# Patient Record
Sex: FEMALE | Race: WHITE | NOT HISPANIC OR LATINO | URBAN - METROPOLITAN AREA
[De-identification: names, ages, dates, MRNs, and addresses within clinical notes are randomized per-mention and may not be internally consistent; named-entity substitution may affect disease eponyms.]

---

## 2017-05-30 ENCOUNTER — OUTPATIENT (OUTPATIENT)
Dept: OUTPATIENT SERVICES | Facility: HOSPITAL | Age: 32
LOS: 1 days | End: 2017-05-30
Payer: COMMERCIAL

## 2017-05-30 DIAGNOSIS — Z90.89 ACQUIRED ABSENCE OF OTHER ORGANS: Chronic | ICD-10-CM

## 2017-05-30 PROCEDURE — 76830 TRANSVAGINAL US NON-OB: CPT | Mod: 26

## 2017-05-30 PROCEDURE — 76830 TRANSVAGINAL US NON-OB: CPT

## 2017-07-07 ENCOUNTER — EMERGENCY (EMERGENCY)
Facility: HOSPITAL | Age: 32
LOS: 1 days | Discharge: HOME | End: 2017-07-07
Admitting: EMERGENCY MEDICINE
Payer: COMMERCIAL

## 2017-07-07 VITALS
OXYGEN SATURATION: 100 % | TEMPERATURE: 98 F | RESPIRATION RATE: 18 BRPM | DIASTOLIC BLOOD PRESSURE: 84 MMHG | HEART RATE: 59 BPM | SYSTOLIC BLOOD PRESSURE: 125 MMHG

## 2017-07-07 DIAGNOSIS — S80.01XA CONTUSION OF RIGHT KNEE, INITIAL ENCOUNTER: ICD-10-CM

## 2017-07-07 DIAGNOSIS — Z90.89 ACQUIRED ABSENCE OF OTHER ORGANS: Chronic | ICD-10-CM

## 2017-07-07 DIAGNOSIS — M25.561 PAIN IN RIGHT KNEE: ICD-10-CM
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
## 2017-07-07 DIAGNOSIS — Z79.899 OTHER LONG TERM (CURRENT) DRUG THERAPY: ICD-10-CM
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
## 2017-07-07 DIAGNOSIS — Z88.8 ALLERGY STATUS TO OTHER DRUGS, MEDICAMENTS AND BIOLOGICAL SUBSTANCES STATUS: ICD-10-CM

## 2017-07-07 PROCEDURE — 99283 EMERGENCY DEPT VISIT LOW MDM: CPT | Mod: 25

## 2017-07-07 PROCEDURE — 73562 X-RAY EXAM OF KNEE 3: CPT

## 2017-07-07 PROCEDURE — 73562 X-RAY EXAM OF KNEE 3: CPT | Mod: 26

## 2017-07-07 PROCEDURE — 73562 X-RAY EXAM OF KNEE 3: CPT | Mod: 26,RT

## 2017-07-07 RX ORDER — IBUPROFEN 200 MG
600 TABLET ORAL ONCE
Qty: 0 | Refills: 0 | Status: COMPLETED | OUTPATIENT
Start: 2017-07-07 | End: 2017-07-07

## 2017-07-07 RX ADMIN — Medication 600 MILLIGRAM(S): at 14:07

## 2017-07-07 NOTE — ED PROVIDER NOTE - OBJECTIVE STATEMENT
The pt is a 32 y/o F, who presents to ED c/o R knee pain s/p hitting it on a drawer at work [employer]. Pain is 6/10, pain aggravated w/walking, has not taken any pain meds. Denies numbness or tingling to toes, decreased ROM, bleeding, any other injuries

## 2017-07-07 NOTE — ED PROVIDER NOTE - MUSCULOSKELETAL, MLM
R knee: no swelling, no discolorations, quads intact, no patella tend, FROM, no increased ligamentous laxity, muscle strength 5/5, good resistance, pedal pulse 2+, normal gait

## 2017-07-07 NOTE — ED ADULT NURSE NOTE - CHPI ED SYMPTOMS NEG
no bruising/no stiffness/no back pain/no tingling/no deformity/no fever/no abrasion/no numbness/no difficulty bearing weight/no weakness

## 2017-07-07 NOTE — ED PROVIDER NOTE - MEDICAL DECISION MAKING DETAILS
pt hit knee on a drawer at work - xray neg for occult injury, ace wrap for ambulatory comfort, to RICE and prn ibuprofen, f/u w/ortho, pt understands and agrees w/plan

## 2018-02-25 VITALS
HEART RATE: 77 BPM | OXYGEN SATURATION: 97 % | DIASTOLIC BLOOD PRESSURE: 70 MMHG | TEMPERATURE: 98 F | RESPIRATION RATE: 16 BRPM | SYSTOLIC BLOOD PRESSURE: 138 MMHG

## 2018-02-25 LAB
ALBUMIN SERPL ELPH-MCNC: 5.1 G/DL — HIGH (ref 3.3–5)
ALP SERPL-CCNC: 70 U/L — SIGNIFICANT CHANGE UP (ref 40–120)
ALT FLD-CCNC: 9 U/L — LOW (ref 10–45)
ANION GAP SERPL CALC-SCNC: 16 MMOL/L — SIGNIFICANT CHANGE UP (ref 5–17)
APPEARANCE UR: CLEAR — SIGNIFICANT CHANGE UP
AST SERPL-CCNC: 14 U/L — SIGNIFICANT CHANGE UP (ref 10–40)
BASOPHILS NFR BLD AUTO: 1 % — SIGNIFICANT CHANGE UP (ref 0–2)
BILIRUB SERPL-MCNC: 0.2 MG/DL — SIGNIFICANT CHANGE UP (ref 0.2–1.2)
BILIRUB UR-MCNC: NEGATIVE — SIGNIFICANT CHANGE UP
BUN SERPL-MCNC: 13 MG/DL — SIGNIFICANT CHANGE UP (ref 7–23)
CALCIUM SERPL-MCNC: 9.6 MG/DL — SIGNIFICANT CHANGE UP (ref 8.4–10.5)
CHLORIDE SERPL-SCNC: 101 MMOL/L — SIGNIFICANT CHANGE UP (ref 96–108)
CO2 SERPL-SCNC: 25 MMOL/L — SIGNIFICANT CHANGE UP (ref 22–31)
COLOR SPEC: YELLOW — SIGNIFICANT CHANGE UP
CREAT SERPL-MCNC: 0.58 MG/DL — SIGNIFICANT CHANGE UP (ref 0.5–1.3)
DIFF PNL FLD: (no result)
EOSINOPHIL NFR BLD AUTO: 0.4 % — SIGNIFICANT CHANGE UP (ref 0–6)
GLUCOSE SERPL-MCNC: 113 MG/DL — HIGH (ref 70–99)
GLUCOSE UR QL: NEGATIVE — SIGNIFICANT CHANGE UP
HCG UR QL: POSITIVE — SIGNIFICANT CHANGE UP
HCT VFR BLD CALC: 42.4 % — SIGNIFICANT CHANGE UP (ref 34.5–45)
HGB BLD-MCNC: 13.7 G/DL — SIGNIFICANT CHANGE UP (ref 11.5–15.5)
KETONES UR-MCNC: NEGATIVE — SIGNIFICANT CHANGE UP
LEUKOCYTE ESTERASE UR-ACNC: NEGATIVE — SIGNIFICANT CHANGE UP
LIDOCAIN IGE QN: 36 U/L — SIGNIFICANT CHANGE UP (ref 7–60)
LYMPHOCYTES # BLD AUTO: 34.1 % — SIGNIFICANT CHANGE UP (ref 13–44)
MCHC RBC-ENTMCNC: 28.8 PG — SIGNIFICANT CHANGE UP (ref 27–34)
MCHC RBC-ENTMCNC: 32.3 G/DL — SIGNIFICANT CHANGE UP (ref 32–36)
MCV RBC AUTO: 89.3 FL — SIGNIFICANT CHANGE UP (ref 80–100)
MONOCYTES NFR BLD AUTO: 7 % — SIGNIFICANT CHANGE UP (ref 2–14)
NEUTROPHILS NFR BLD AUTO: 57.5 % — SIGNIFICANT CHANGE UP (ref 43–77)
NITRITE UR-MCNC: NEGATIVE — SIGNIFICANT CHANGE UP
PH UR: 5.5 — SIGNIFICANT CHANGE UP (ref 5–8)
PLATELET # BLD AUTO: 265 K/UL — SIGNIFICANT CHANGE UP (ref 150–400)
POTASSIUM SERPL-MCNC: 3.9 MMOL/L — SIGNIFICANT CHANGE UP (ref 3.5–5.3)
POTASSIUM SERPL-SCNC: 3.9 MMOL/L — SIGNIFICANT CHANGE UP (ref 3.5–5.3)
PROT SERPL-MCNC: 8.1 G/DL — SIGNIFICANT CHANGE UP (ref 6–8.3)
PROT UR-MCNC: NEGATIVE MG/DL — SIGNIFICANT CHANGE UP
RBC # BLD: 4.75 M/UL — SIGNIFICANT CHANGE UP (ref 3.8–5.2)
RBC # FLD: 13 % — SIGNIFICANT CHANGE UP (ref 10.3–16.9)
SODIUM SERPL-SCNC: 142 MMOL/L — SIGNIFICANT CHANGE UP (ref 135–145)
SP GR SPEC: >=1.03 — SIGNIFICANT CHANGE UP (ref 1–1.03)
UROBILINOGEN FLD QL: 0.2 E.U./DL — SIGNIFICANT CHANGE UP
WBC # BLD: 10.5 K/UL — SIGNIFICANT CHANGE UP (ref 3.8–10.5)
WBC # FLD AUTO: 10.5 K/UL — SIGNIFICANT CHANGE UP (ref 3.8–10.5)

## 2018-02-25 PROCEDURE — 76856 US EXAM PELVIC COMPLETE: CPT | Mod: 26

## 2018-02-25 PROCEDURE — 99285 EMERGENCY DEPT VISIT HI MDM: CPT

## 2018-02-25 PROCEDURE — 76830 TRANSVAGINAL US NON-OB: CPT | Mod: 26

## 2018-02-25 PROCEDURE — 76705 ECHO EXAM OF ABDOMEN: CPT | Mod: 26

## 2018-02-25 RX ORDER — MORPHINE SULFATE 50 MG/1
2 CAPSULE, EXTENDED RELEASE ORAL ONCE
Qty: 0 | Refills: 0 | Status: DISCONTINUED | OUTPATIENT
Start: 2018-02-25 | End: 2018-02-25

## 2018-02-25 NOTE — ED ADULT NURSE NOTE - OBJECTIVE STATEMENT
Pt to ER w/ report of R-sided lower abd pain.  Pt denies cp/sob/f/c.  Breathing unlabored, skin warm and dry. IV access established, labs drawn and sent. Will continue to monitor. Pt to ER w/ report of R-sided lower abd pain today.  Pt describes pain as radiating to R. leg.  Pt denies cp/sob/f/c.  Breathing unlabored, skin warm and dry. IV access established, labs drawn and sent. Will continue to monitor.

## 2018-02-26 ENCOUNTER — INPATIENT (INPATIENT)
Facility: HOSPITAL | Age: 33
LOS: 0 days | Discharge: HOME | DRG: 777 | End: 2018-02-26
Attending: OBSTETRICS & GYNECOLOGY | Admitting: OBSTETRICS & GYNECOLOGY
Payer: COMMERCIAL

## 2018-02-26 ENCOUNTER — TRANSCRIPTION ENCOUNTER (OUTPATIENT)
Age: 33
End: 2018-02-26

## 2018-02-26 VITALS
OXYGEN SATURATION: 98 % | SYSTOLIC BLOOD PRESSURE: 118 MMHG | HEART RATE: 87 BPM | RESPIRATION RATE: 16 BRPM | DIASTOLIC BLOOD PRESSURE: 64 MMHG | TEMPERATURE: 97 F

## 2018-02-26 DIAGNOSIS — Z90.89 ACQUIRED ABSENCE OF OTHER ORGANS: Chronic | ICD-10-CM

## 2018-02-26 LAB
HCT VFR BLD CALC: 37.1 % — SIGNIFICANT CHANGE UP (ref 34.5–45)
HCT VFR BLD CALC: 37.4 % — SIGNIFICANT CHANGE UP (ref 34.5–45)
HGB BLD-MCNC: 11.9 G/DL — SIGNIFICANT CHANGE UP (ref 11.5–15.5)
HGB BLD-MCNC: 12.4 G/DL — SIGNIFICANT CHANGE UP (ref 11.5–15.5)
MCHC RBC-ENTMCNC: 28.9 PG — SIGNIFICANT CHANGE UP (ref 27–34)
MCHC RBC-ENTMCNC: 29.8 PG — SIGNIFICANT CHANGE UP (ref 27–34)
MCHC RBC-ENTMCNC: 32.1 G/DL — SIGNIFICANT CHANGE UP (ref 32–36)
MCHC RBC-ENTMCNC: 33.2 G/DL — SIGNIFICANT CHANGE UP (ref 32–36)
MCV RBC AUTO: 89.9 FL — SIGNIFICANT CHANGE UP (ref 80–100)
MCV RBC AUTO: 90 FL — SIGNIFICANT CHANGE UP (ref 80–100)
PLATELET # BLD AUTO: 195 K/UL — SIGNIFICANT CHANGE UP (ref 150–400)
PLATELET # BLD AUTO: 230 K/UL — SIGNIFICANT CHANGE UP (ref 150–400)
RBC # BLD: 4.12 M/UL — SIGNIFICANT CHANGE UP (ref 3.8–5.2)
RBC # BLD: 4.16 M/UL — SIGNIFICANT CHANGE UP (ref 3.8–5.2)
RBC # FLD: 13.2 % — SIGNIFICANT CHANGE UP (ref 10.3–16.9)
RBC # FLD: 13.4 % — SIGNIFICANT CHANGE UP (ref 10.3–16.9)
WBC # BLD: 11.2 K/UL — HIGH (ref 3.8–10.5)
WBC # BLD: 7.9 K/UL — SIGNIFICANT CHANGE UP (ref 3.8–10.5)
WBC # FLD AUTO: 11.2 K/UL — HIGH (ref 3.8–10.5)
WBC # FLD AUTO: 7.9 K/UL — SIGNIFICANT CHANGE UP (ref 3.8–10.5)

## 2018-02-26 PROCEDURE — 86901 BLOOD TYPING SEROLOGIC RH(D): CPT

## 2018-02-26 PROCEDURE — 86850 RBC ANTIBODY SCREEN: CPT

## 2018-02-26 PROCEDURE — 83690 ASSAY OF LIPASE: CPT

## 2018-02-26 PROCEDURE — 85027 COMPLETE CBC AUTOMATED: CPT

## 2018-02-26 PROCEDURE — 96374 THER/PROPH/DIAG INJ IV PUSH: CPT

## 2018-02-26 PROCEDURE — 81001 URINALYSIS AUTO W/SCOPE: CPT

## 2018-02-26 PROCEDURE — 76856 US EXAM PELVIC COMPLETE: CPT

## 2018-02-26 PROCEDURE — 80053 COMPREHEN METABOLIC PANEL: CPT

## 2018-02-26 PROCEDURE — 81025 URINE PREGNANCY TEST: CPT

## 2018-02-26 PROCEDURE — 76705 ECHO EXAM OF ABDOMEN: CPT

## 2018-02-26 PROCEDURE — 85025 COMPLETE CBC W/AUTO DIFF WBC: CPT

## 2018-02-26 PROCEDURE — 86900 BLOOD TYPING SEROLOGIC ABO: CPT

## 2018-02-26 PROCEDURE — 99285 EMERGENCY DEPT VISIT HI MDM: CPT | Mod: 25

## 2018-02-26 PROCEDURE — 76830 TRANSVAGINAL US NON-OB: CPT

## 2018-02-26 PROCEDURE — 84702 CHORIONIC GONADOTROPIN TEST: CPT

## 2018-02-26 RX ORDER — MORPHINE SULFATE 50 MG/1
2 CAPSULE, EXTENDED RELEASE ORAL ONCE
Qty: 0 | Refills: 0 | Status: DISCONTINUED | OUTPATIENT
Start: 2018-02-26 | End: 2018-02-26

## 2018-02-26 RX ORDER — SODIUM CHLORIDE 9 MG/ML
1000 INJECTION, SOLUTION INTRAVENOUS
Qty: 0 | Refills: 0 | Status: DISCONTINUED | OUTPATIENT
Start: 2018-02-26 | End: 2018-02-26

## 2018-02-26 RX ADMIN — MORPHINE SULFATE 2 MILLIGRAM(S): 50 CAPSULE, EXTENDED RELEASE ORAL at 01:10

## 2018-02-26 RX ADMIN — MORPHINE SULFATE 2 MILLIGRAM(S): 50 CAPSULE, EXTENDED RELEASE ORAL at 00:54

## 2018-02-26 NOTE — DISCHARGE NOTE ADULT - CARE PLAN
Principal Discharge DX:	Pregnancy of unknown anatomic location  Goal:	Recovery  Assessment and plan of treatment:	Pelvic rest (nothing in vagina) x4 weeks. Follow up with own GYN or GYN at [facility] 2/27/18 or come to ED for repeat bHCG check. It is of high importance that you follow up on 2/27/18 and get bHCG level checked. Go to nearest ED if fever >100.4, severe abdominal pain or heavy vaginal bleeding.

## 2018-02-26 NOTE — DISCHARGE NOTE ADULT - PATIENT PORTAL LINK FT
You can access the [facility] Patient Portal, offered by [facility], by registering with the following website: [email or web address]

## 2018-02-26 NOTE — DISCHARGE NOTE ADULT - CARE PROVIDER_API CALL
Ruddy Borja), Obstetrics and Gynecology  [address] [place] [address]  Phone: [phone]  Fax: [phone]

## 2018-02-26 NOTE — H&P ADULT - HISTORY OF PRESENT ILLNESS
31yo  at 1w4d by LMP 2/15 presents to ED with RLQ pain since 7:30am . Patient reports pain is worsened by movement and sitting up. She notes pain became 13/10 in severity at 5pm causing her to come to the ED. Pt has a Mirena IUD placed in April or 2017. Pt notes vaginal spotting that started today. Patient reports 2 episodes of diarrhea yesterday, but otherwise denies fever, chills, nausea, vomiting. Pt did not know she was pregnant prior to coming to the ED. Pt reports if she has an IUP, it is desired.    ObHx:   - x2. last delivery 13months ago, she got pregnant while having a Raissa IUD  GynHx: mirena IUD placed april or 2017 pt is unsure. reports h/o ovarian cysts but denies fibroids, polyps, abnormal pap smear, STIs  PMH: denies  PSH: upper jaw surgery , tonsillectomy and adenoidectomy at 4yo  Meds: denies  NKDA    Vital Signs Last 24 Hrs  T(C): 36.3 (2018 03:43), Max: 36.9 (2018 00:21)  T(F): 97.3 (2018 03:43), Max: 98.4 (2018 00:21)  HR: 82 (2018 03:43) (74 - 82)  BP: 137/97 (2018 03:43) (121/77 - 138/70)  BP(mean): --  RR: 16 (2018 03:43) (16 - 16)  SpO2: 98% (2018 03:43) (97% - 98%)  Gen: NAD  Card: RRR no m/r/g  Pulm: CTAB no crackles or wheezes  Abd: soft, mildly tender to palpation of right suprapubic region, no rebound or guarding  : normal appearing external genitaila. on speculum exam cervix appears long and closed, IUD strings and end of IUD visualized from cervical os. scant dark blood in vaginla vault and at cervical os, no active bleeding. on bimanual exam diffuse tenderness to posterior vaginal      Hemoglobin: 13.7 g/dL (18 @ 20:32               12.4   11.2  )-----------( 230      ( 2018 02:11 )             37.4     HCG Quantitative, Serum (18 @ 20:32)    HCG Quantitative, Serum: 150.4    Urinalysis Basic - ( 2018 20:32 )    Color: Yellow / Appearance: Clear / SG: >=1.030 / pH: x  Gluc: x / Ketone: NEGATIVE  / Bili: Negative / Urobili: 0.2 E.U./dL   Blood: x / Protein: NEGATIVE mg/dL / Nitrite: NEGATIVE   Leuk Esterase: NEGATIVE / RBC: Many /HPF / WBC < 5 /HPF   Sq Epi: x / Non Sq Epi: 0-5 /HPF / Bacteria: Present /HPF          INTERPRETATION:  PELVIC ULTRASOUND dated 2018 11:17 PM    INDICATION: 32 year old female with right lower quadrant pain, vaginal   bleeding. Patient has an IUD.  B-HC     TECHNIQUE: Transabdominal views of the pelvis were obtained followed by   transvaginal views for better visualization of the ovaries.      PRIOR STUDIES: Transvaginal ultrasound dated 2017     FINDINGS:   These images demonstrate the uterus to be anteverted. The uterus is   normal in size and shape.  The uterus is 7.1 x 3.7 x 3.7 cm.  No   myometrial abnormalities are seen. An IUD device is seen within the lower   uterine segment/upper cervix. The endometrium is 0.2 cm in thickness,   which is normal.    The right ovary is normal in size, measuring 3.5 x 2.1 x 1.7 cm. Within   the right ovary, there is a 1.7 cm corpus luteum and a 0.7 cm dominant   follicle. The left ovary is normal in size, measuring 3.2 x 2.1 x 1.7 cm.   No left ovarian masses are seen. Doppler evaluation demonstrates flow to   both ovaries with no evidence of torsion.      There is a small to moderate amount of complex free fluid within the   pelvis, the majority of which surrounds the right ovary. No definite   ectopic pregnancy is visualized within the pelvis.    A physiologic amount of free fluid is seen in the cul-de-sac.      IMPRESSION:   No intra or extrauterine gestation identified. There is small to moderate   amount of complex free fluid within the pelvis. An extra uterine   pregnancy cannot be excluded.  OB/GYN consultation is recommended. 33yo  at 1w4d by LMP 2/15 presents to ED with RLQ pain since 7:30am . Patient reports pain is worsened by movement and sitting up. She notes pain became 13/10 in severity at 5pm causing her to come to the ED. Pt has a Mirena IUD placed in April or 2017. Pt notes vaginal spotting that started today. Patient reports 2 episodes of diarrhea yesterday, but otherwise denies fever, chills, nausea, vomiting. Pt did not know she was pregnant prior to coming to the ED. Pt reports if she has an IUP, it is desired.    ObHx:   - x2. last delivery 13months ago, she got pregnant while having a Raissa IUD  GynHx: mirena IUD placed april or 2017 pt is unsure. reports h/o ovarian cysts but denies fibroids, polyps, abnormal pap smear, STIs  PMH: denies  PSH: upper jaw surgery , tonsillectomy and adenoidectomy at 6yo  Meds: denies  NKDA    Vital Signs Last 24 Hrs  T(C): 36.3 (2018 03:43), Max: 36.9 (2018 00:21)  T(F): 97.3 (2018 03:43), Max: 98.4 (2018 00:21)  HR: 82 (2018 03:43) (74 - 82)  BP: 137/97 (2018 03:43) (121/77 - 138/70)  BP(mean): --  RR: 16 (2018 03:43) (16 - 16)  SpO2: 98% (2018 03:43) (97% - 98%)  Gen: NAD  Card: RRR no m/r/g  Pulm: CTAB no crackles or wheezes  Abd: soft, mildly tender to palpation of right suprapubic region, no rebound or guarding  : normal appearing external genitaila. on speculum exam cervix appears long and closed, IUD strings and end of IUD visualized from cervical os. scant dark blood in vaginla vault and at cervical os, no active bleeding. on bimanual exam diffuse tenderness to posterior vaginal  Back: no CVA tenderness bilaterally    Hemoglobin: 13.7 g/dL (02.25.18 @ 20:32               12.4   11.2  )-----------( 230      ( 2018 02:11 )             37.4     HCG Quantitative, Serum (18 @ 20:32)    HCG Quantitative, Serum: 150.4    Urinalysis Basic - ( 2018 20:32 )    Color: Yellow / Appearance: Clear / SG: >=1.030 / pH: x  Gluc: x / Ketone: NEGATIVE  / Bili: Negative / Urobili: 0.2 E.U./dL   Blood: x / Protein: NEGATIVE mg/dL / Nitrite: NEGATIVE   Leuk Esterase: NEGATIVE / RBC: Many /HPF / WBC < 5 /HPF   Sq Epi: x / Non Sq Epi: 0-5 /HPF / Bacteria: Present /HPF          INTERPRETATION:  PELVIC ULTRASOUND dated 2018 11:17 PM    INDICATION: 32 year old female with right lower quadrant pain, vaginal   bleeding. Patient has an IUD.  B-HC     TECHNIQUE: Transabdominal views of the pelvis were obtained followed by   transvaginal views for better visualization of the ovaries.      PRIOR STUDIES: Transvaginal ultrasound dated 2017     FINDINGS:   These images demonstrate the uterus to be anteverted. The uterus is   normal in size and shape.  The uterus is 7.1 x 3.7 x 3.7 cm.  No   myometrial abnormalities are seen. An IUD device is seen within the lower   uterine segment/upper cervix. The endometrium is 0.2 cm in thickness,   which is normal.    The right ovary is normal in size, measuring 3.5 x 2.1 x 1.7 cm. Within   the right ovary, there is a 1.7 cm corpus luteum and a 0.7 cm dominant   follicle. The left ovary is normal in size, measuring 3.2 x 2.1 x 1.7 cm.   No left ovarian masses are seen. Doppler evaluation demonstrates flow to   both ovaries with no evidence of torsion.      There is a small to moderate amount of complex free fluid within the   pelvis, the majority of which surrounds the right ovary. No definite   ectopic pregnancy is visualized within the pelvis.    A physiologic amount of free fluid is seen in the cul-de-sac.      IMPRESSION:   No intra or extrauterine gestation identified. There is small to moderate   amount of complex free fluid within the pelvis. An extra uterine   pregnancy cannot be excluded.  OB/GYN consultation is recommended.

## 2018-02-26 NOTE — ED PROVIDER NOTE - PHYSICAL EXAMINATION
CONSTITUTIONAL: Well appearing, well nourished, awake, alert and in no apparent distress.  HEENT: Head is atraumatic. Eyes clear bilaterally, normal EOMI. Airway patent.  CARDIAC: Normal rate, regular rhythm.  Heart sounds S1, S2.   RESPIRATORY: Breath sounds clear and equal bilaterally.  GASTROINTESTINAL: Abdomen soft, non-tender, no guarding. RLQ pelvic tenderness with focal rebound tenderness.  MUSCULOSKELETAL: Spine appears normal, range of motion is not limited, no muscle or joint tenderness.   NEUROLOGICAL: Alert and oriented, no focal deficits, no motor or sensory deficits.  SKIN: Skin normal color for race, warm, dry and intact. No evidence of rash.  PSYCHIATRIC: Alert and oriented to person, place, time/situation. normal mood and affect. no apparent risk to self or others.

## 2018-02-26 NOTE — DISCHARGE NOTE ADULT - PLAN OF CARE
Recovery Pelvic rest (nothing in vagina) x4 weeks. Follow up with own GYN or GYN at [facility] 2/27/18 or come to ED for repeat bHCG check. It is of high importance that you follow up on 2/27/18 and get bHCG level checked. Go to nearest ED if fever >100.4, severe abdominal pain or heavy vaginal bleeding.

## 2018-02-26 NOTE — DISCHARGE NOTE ADULT - HOSPITAL COURSE
31yo admitted for possible ectopic pregnancy given clinical symptoms and imaging revealing small to moderate complex fluid in pelvis. TVUS revealed pregnancy of unknown location and throughout hospital course patient's abdominal pain improved. Hemoglobin stable 12.4 to 11.9 and Pt hemodynamically stable at time of discharge.

## 2018-02-26 NOTE — PROGRESS NOTE ADULT - SUBJECTIVE AND OBJECTIVE BOX
Pt seen and examined at bedside. Pt states abdominal pain has improved and that she did not need to take any pain medication all morning.   Abdominal exam: deep tenderness to palpation lower abdomen.   Pt to be discharged and get repeat bHCG drawn tomorrow 2/27/18.   Pt discussed with Dr. Borja

## 2018-02-26 NOTE — PROGRESS NOTE ADULT - SUBJECTIVE AND OBJECTIVE BOX
Pt seen at bedside w/ GYN team and Dr. Hidalgo- pt reports feeling significant improvement in pain, denies HA, dizziness, CP, palpitations, SOB, n/v, vaginal bleeding. Physical exam unremarkable, mild tenderness on deep palpation of RLQ, w/o rebound or guarding, pt clinically improved, CBC to be drawn now, NPO, continue to monitor.

## 2018-02-26 NOTE — ED PROVIDER NOTE - OBJECTIVE STATEMENT
31 yo  w one day of RLQ abd pain, worsening in nature accompanied by vaginal spotting, described as bright red that started today. Pt had IUD in place, no other abnormal vaginal dc, other abd pain, n/v. Pain worse with movement. LMP pt believes 2/15. Pt has not tried anything for symptoms, no other aggravating or relieving factors.

## 2018-02-26 NOTE — H&P ADULT - ASSESSMENT
31yo  at 1w4d by LMP 2/15 being admitted for observation for pregnancy of unknown location r/o ruptured vs aborting ectopic pregnancy. given patient is clinically and hemodynamically stable with stable Hgb, will observe patient inpatient. If clinical status worsens, or patient becomes clinically unstable, plan to take patient to OR for diagnostic laparoscopy. Pt gave written consent for IUD removal, and IUD removed in ED. Patient to be kept NPO on IVF for possible surgery. repeat CBC at 8am. Serial abdominal exams.    Plan discussed with Dr. Hidalgo.

## 2018-02-26 NOTE — PROGRESS NOTE ADULT - ASSESSMENT
33yo  at 1w4d by LMP 2/15 being admitted for observation for pregnancy of unknown location r/o ruptured vs aborting ectopic pregnancy. Patient remains clinically and hemodynamically stable. serial CBC q4-6hr, next CBC at 8pm. Keep patient NPO for possible OR today. Serial abdominal exams. If H/H and abdominal exam stable, consider outpatient followup of beta hcg.

## 2018-02-26 NOTE — PROGRESS NOTE ADULT - SUBJECTIVE AND OBJECTIVE BOX
Patient seen and evaluated at bedside. Patient reports no pain while laying still on her back. She feels pain when she moves and when she lays on her right side. She has not received any pain medication since 1am. She denies lightheadedness, SOB, chest pain, palpitations, nausea, vomiting. She has not had anything to eat or drink.     Vital Signs Last 24 Hrs  T(C): 36.4 (26 Feb 2018 04:56), Max: 36.9 (26 Feb 2018 00:21)  T(F): 97.5 (26 Feb 2018 04:56), Max: 98.4 (26 Feb 2018 00:21)  HR: 67 (26 Feb 2018 04:56) (67 - 82)  BP: 128/68 (26 Feb 2018 04:56) (121/77 - 138/70)  BP(mean): --  RR: 16 (26 Feb 2018 04:56) (16 - 16)  SpO2: 98% (26 Feb 2018 04:56) (97% - 98%)  Gen: NAD  Pulm: Nonlabored breathing  Abd: soft, nondistended, nontender to deep palpation, +rebound in right suprapubic region, no guarding                          12.4   11.2  )-----------( 230      ( 26 Feb 2018 02:11 )             37.4 Patient seen and evaluated at bedside. Patient reports no pain while laying still on her back. She feels pain when she moves and when she lays on her right side. She has not received any pain medication since 1am. She denies lightheadedness, SOB, chest pain, palpitations, nausea, vomiting. She has not had anything to eat or drink.     Vital Signs Last 24 Hrs  T(C): 36.4 (26 Feb 2018 04:56), Max: 36.9 (26 Feb 2018 00:21)  T(F): 97.5 (26 Feb 2018 04:56), Max: 98.4 (26 Feb 2018 00:21)  HR: 67 (26 Feb 2018 04:56) (67 - 82)  BP: 128/68 (26 Feb 2018 04:56) (121/77 - 138/70)  BP(mean): --  RR: 16 (26 Feb 2018 04:56) (16 - 16)  SpO2: 98% (26 Feb 2018 04:56) (97% - 98%)  Gen: NAD  Pulm: Nonlabored breathing  Abd: soft, nondistended, tenderness to palpation of right suprapubic region, no rebound or guarding                   12.4   11.2  )-----------( 230      ( 26 Feb 2018 02:11 )             37.4

## 2018-02-26 NOTE — ED PROVIDER NOTE - DISPOSITION TYPE
Alert-The patient is alert, awake and responds to voice. The patient is oriented to time, place, and person. The triage nurse is able to obtain subjective information.
ADMIT

## 2018-02-26 NOTE — ED PROVIDER NOTE - MEDICAL DECISION MAKING DETAILS
+ R pelvic pain w/vaginal bleeding, + pregnancy w/out IUP visualized on sono, downtrending H&H however stable, admitting to GYN for likely ex-lap/surgical management. Do not suspect GI/infectious process.

## 2018-02-28 VITALS
TEMPERATURE: 100 F | DIASTOLIC BLOOD PRESSURE: 85 MMHG | OXYGEN SATURATION: 100 % | SYSTOLIC BLOOD PRESSURE: 138 MMHG | WEIGHT: 145.06 LBS | HEART RATE: 90 BPM | RESPIRATION RATE: 18 BRPM

## 2018-02-28 DIAGNOSIS — O99.89 OTHER SPECIFIED DISEASES AND CONDITIONS COMPLICATING PREGNANCY, CHILDBIRTH AND THE PUERPERIUM: ICD-10-CM

## 2018-02-28 DIAGNOSIS — Z97.5 PRESENCE OF (INTRAUTERINE) CONTRACEPTIVE DEVICE: ICD-10-CM

## 2018-02-28 DIAGNOSIS — O00.90 UNSPECIFIED ECTOPIC PREGNANCY WITHOUT INTRAUTERINE PREGNANCY: ICD-10-CM

## 2018-02-28 PROCEDURE — 99285 EMERGENCY DEPT VISIT HI MDM: CPT | Mod: 25

## 2018-02-28 PROCEDURE — 76817 TRANSVAGINAL US OBSTETRIC: CPT | Mod: 26

## 2018-02-28 RX ORDER — ACETAMINOPHEN 500 MG
650 TABLET ORAL ONCE
Qty: 0 | Refills: 0 | Status: DISCONTINUED | OUTPATIENT
Start: 2018-02-28 | End: 2018-02-28

## 2018-02-28 RX ORDER — SODIUM CHLORIDE 9 MG/ML
1000 INJECTION INTRAMUSCULAR; INTRAVENOUS; SUBCUTANEOUS ONCE
Qty: 0 | Refills: 0 | Status: COMPLETED | OUTPATIENT
Start: 2018-02-28 | End: 2018-02-28

## 2018-02-28 NOTE — ED ADULT NURSE NOTE - OBJECTIVE STATEMENT
Pt states " I have been bleeding x 13 days and I came in on Sunday for abd pain. I have an IUD in but my HCG was elevated so they thought I was having an ectopic pregnancy. They couldn't verify it cause it was too small. I had an U/S yesterday and Methotrexate shot today at 4pm. I am still having RLQ pain radiating down my rt leg".

## 2018-02-28 NOTE — ED PROVIDER NOTE - PROGRESS NOTE DETAILS
pending GYN reevaluation pending GYN reevaluation.  if pain worsens GYN will likely take to OR, if pt feels well can return for repeat methotrexate dosing

## 2018-02-28 NOTE — ED PROVIDER NOTE - OBJECTIVE STATEMENT
32F , no PMH, c/o RLQ pain. pt states was diagnosed with possible ectopic pregnancy, was admitted here for observation.  saw her GYN today and was given methotrexate at 4P.  now with worsening RLQ pain. no n/v. no fevers.

## 2018-02-28 NOTE — ED ADULT TRIAGE NOTE - ARRIVAL INFO ADDITIONAL COMMENTS
pt seen here and diagnosed with "ectopic pregnancy", received methotrexate today at 4pm, c/o severe worsening RLQ pain radiating to right groin and vaginal bleeding (bleeding for 10 days). febrile here today

## 2018-03-01 ENCOUNTER — INPATIENT (INPATIENT)
Facility: HOSPITAL | Age: 33
LOS: 0 days | Discharge: HOME | DRG: 777 | End: 2018-03-02
Attending: OBSTETRICS & GYNECOLOGY | Admitting: OBSTETRICS & GYNECOLOGY
Payer: COMMERCIAL

## 2018-03-01 ENCOUNTER — RESULT REVIEW (OUTPATIENT)
Age: 33
End: 2018-03-01

## 2018-03-01 DIAGNOSIS — Z90.89 ACQUIRED ABSENCE OF OTHER ORGANS: Chronic | ICD-10-CM

## 2018-03-01 LAB
ALBUMIN SERPL ELPH-MCNC: 4.7 G/DL — SIGNIFICANT CHANGE UP (ref 3.3–5)
ALP SERPL-CCNC: 71 U/L — SIGNIFICANT CHANGE UP (ref 40–120)
ALT FLD-CCNC: 8 U/L — LOW (ref 10–45)
ANION GAP SERPL CALC-SCNC: 14 MMOL/L — SIGNIFICANT CHANGE UP (ref 5–17)
APPEARANCE UR: (no result)
APTT BLD: 28.2 SEC — SIGNIFICANT CHANGE UP (ref 27.5–37.4)
AST SERPL-CCNC: 11 U/L — SIGNIFICANT CHANGE UP (ref 10–40)
BASOPHILS NFR BLD AUTO: 0.9 % — SIGNIFICANT CHANGE UP (ref 0–2)
BILIRUB SERPL-MCNC: <0.2 MG/DL — SIGNIFICANT CHANGE UP (ref 0.2–1.2)
BILIRUB UR-MCNC: NEGATIVE — SIGNIFICANT CHANGE UP
BLD GP AB SCN SERPL QL: NEGATIVE — SIGNIFICANT CHANGE UP
BUN SERPL-MCNC: 14 MG/DL — SIGNIFICANT CHANGE UP (ref 7–23)
CALCIUM SERPL-MCNC: 9.4 MG/DL — SIGNIFICANT CHANGE UP (ref 8.4–10.5)
CHLORIDE SERPL-SCNC: 101 MMOL/L — SIGNIFICANT CHANGE UP (ref 96–108)
CO2 SERPL-SCNC: 23 MMOL/L — SIGNIFICANT CHANGE UP (ref 22–31)
COLOR SPEC: YELLOW — SIGNIFICANT CHANGE UP
CREAT SERPL-MCNC: 0.52 MG/DL — SIGNIFICANT CHANGE UP (ref 0.5–1.3)
DIFF PNL FLD: (no result)
EOSINOPHIL NFR BLD AUTO: 0.4 % — SIGNIFICANT CHANGE UP (ref 0–6)
GLUCOSE SERPL-MCNC: 114 MG/DL — HIGH (ref 70–99)
GLUCOSE UR QL: NEGATIVE — SIGNIFICANT CHANGE UP
HCG SERPL-ACNC: 101.6 MIU/ML — HIGH
HCT VFR BLD CALC: 36 % — SIGNIFICANT CHANGE UP (ref 34.5–45)
HCT VFR BLD CALC: 38.7 % — SIGNIFICANT CHANGE UP (ref 34.5–45)
HGB BLD-MCNC: 11.9 G/DL — SIGNIFICANT CHANGE UP (ref 11.5–15.5)
HGB BLD-MCNC: 12.6 G/DL — SIGNIFICANT CHANGE UP (ref 11.5–15.5)
INR BLD: 1.04 — SIGNIFICANT CHANGE UP (ref 0.88–1.16)
KETONES UR-MCNC: NEGATIVE — SIGNIFICANT CHANGE UP
LEUKOCYTE ESTERASE UR-ACNC: NEGATIVE — SIGNIFICANT CHANGE UP
LYMPHOCYTES # BLD AUTO: 28.7 % — SIGNIFICANT CHANGE UP (ref 13–44)
MCHC RBC-ENTMCNC: 29.3 PG — SIGNIFICANT CHANGE UP (ref 27–34)
MCHC RBC-ENTMCNC: 30.1 PG — SIGNIFICANT CHANGE UP (ref 27–34)
MCHC RBC-ENTMCNC: 32.6 G/DL — SIGNIFICANT CHANGE UP (ref 32–36)
MCHC RBC-ENTMCNC: 33.1 G/DL — SIGNIFICANT CHANGE UP (ref 32–36)
MCV RBC AUTO: 90 FL — SIGNIFICANT CHANGE UP (ref 80–100)
MCV RBC AUTO: 90.9 FL — SIGNIFICANT CHANGE UP (ref 80–100)
MONOCYTES NFR BLD AUTO: 8.8 % — SIGNIFICANT CHANGE UP (ref 2–14)
NEUTROPHILS NFR BLD AUTO: 61.2 % — SIGNIFICANT CHANGE UP (ref 43–77)
NITRITE UR-MCNC: NEGATIVE — SIGNIFICANT CHANGE UP
PH UR: 6 — SIGNIFICANT CHANGE UP (ref 5–8)
PLATELET # BLD AUTO: 193 K/UL — SIGNIFICANT CHANGE UP (ref 150–400)
PLATELET # BLD AUTO: 199 K/UL — SIGNIFICANT CHANGE UP (ref 150–400)
POTASSIUM SERPL-MCNC: 3.9 MMOL/L — SIGNIFICANT CHANGE UP (ref 3.5–5.3)
POTASSIUM SERPL-SCNC: 3.9 MMOL/L — SIGNIFICANT CHANGE UP (ref 3.5–5.3)
PROT SERPL-MCNC: 7.4 G/DL — SIGNIFICANT CHANGE UP (ref 6–8.3)
PROT UR-MCNC: NEGATIVE MG/DL — SIGNIFICANT CHANGE UP
PROTHROM AB SERPL-ACNC: 11.5 SEC — SIGNIFICANT CHANGE UP (ref 9.8–12.7)
RBC # BLD: 3.96 M/UL — SIGNIFICANT CHANGE UP (ref 3.8–5.2)
RBC # BLD: 4.3 M/UL — SIGNIFICANT CHANGE UP (ref 3.8–5.2)
RBC # FLD: 13.1 % — SIGNIFICANT CHANGE UP (ref 10.3–16.9)
RBC # FLD: 13.3 % — SIGNIFICANT CHANGE UP (ref 10.3–16.9)
RH IG SCN BLD-IMP: POSITIVE — SIGNIFICANT CHANGE UP
SODIUM SERPL-SCNC: 138 MMOL/L — SIGNIFICANT CHANGE UP (ref 135–145)
SP GR SPEC: 1.02 — SIGNIFICANT CHANGE UP (ref 1–1.03)
UROBILINOGEN FLD QL: 0.2 E.U./DL — SIGNIFICANT CHANGE UP
WBC # BLD: 5.4 K/UL — SIGNIFICANT CHANGE UP (ref 3.8–10.5)
WBC # BLD: 7 K/UL — SIGNIFICANT CHANGE UP (ref 3.8–10.5)
WBC # FLD AUTO: 5.4 K/UL — SIGNIFICANT CHANGE UP (ref 3.8–10.5)
WBC # FLD AUTO: 7 K/UL — SIGNIFICANT CHANGE UP (ref 3.8–10.5)

## 2018-03-01 RX ORDER — OXYCODONE HYDROCHLORIDE 5 MG/1
5 TABLET ORAL ONCE
Qty: 0 | Refills: 0 | Status: DISCONTINUED | OUTPATIENT
Start: 2018-03-01 | End: 2018-03-01

## 2018-03-01 RX ORDER — SODIUM CHLORIDE 9 MG/ML
1000 INJECTION INTRAMUSCULAR; INTRAVENOUS; SUBCUTANEOUS ONCE
Qty: 0 | Refills: 0 | Status: COMPLETED | OUTPATIENT
Start: 2018-03-01 | End: 2018-03-01

## 2018-03-01 RX ORDER — ONDANSETRON 8 MG/1
4 TABLET, FILM COATED ORAL ONCE
Qty: 0 | Refills: 0 | Status: COMPLETED | OUTPATIENT
Start: 2018-03-01 | End: 2018-03-01

## 2018-03-01 RX ORDER — OXYCODONE AND ACETAMINOPHEN 5; 325 MG/1; MG/1
2 TABLET ORAL EVERY 6 HOURS
Qty: 0 | Refills: 0 | Status: DISCONTINUED | OUTPATIENT
Start: 2018-03-01 | End: 2018-03-02

## 2018-03-01 RX ORDER — OXYCODONE AND ACETAMINOPHEN 5; 325 MG/1; MG/1
1 TABLET ORAL ONCE
Qty: 0 | Refills: 0 | Status: DISCONTINUED | OUTPATIENT
Start: 2018-03-01 | End: 2018-03-01

## 2018-03-01 RX ORDER — METOCLOPRAMIDE HCL 10 MG
10 TABLET ORAL EVERY 6 HOURS
Qty: 0 | Refills: 0 | Status: DISCONTINUED | OUTPATIENT
Start: 2018-03-01 | End: 2018-03-02

## 2018-03-01 RX ORDER — IBUPROFEN 200 MG
600 TABLET ORAL EVERY 6 HOURS
Qty: 0 | Refills: 0 | Status: DISCONTINUED | OUTPATIENT
Start: 2018-03-01 | End: 2018-03-02

## 2018-03-01 RX ORDER — OXYCODONE AND ACETAMINOPHEN 5; 325 MG/1; MG/1
1 TABLET ORAL EVERY 4 HOURS
Qty: 0 | Refills: 0 | Status: DISCONTINUED | OUTPATIENT
Start: 2018-03-01 | End: 2018-03-02

## 2018-03-01 RX ORDER — OXYCODONE AND ACETAMINOPHEN 5; 325 MG/1; MG/1
1 TABLET ORAL EVERY 4 HOURS
Qty: 0 | Refills: 0 | Status: DISCONTINUED | OUTPATIENT
Start: 2018-03-01 | End: 2018-03-01

## 2018-03-01 RX ORDER — HYDROMORPHONE HYDROCHLORIDE 2 MG/ML
0.5 INJECTION INTRAMUSCULAR; INTRAVENOUS; SUBCUTANEOUS
Qty: 0 | Refills: 0 | Status: DISCONTINUED | OUTPATIENT
Start: 2018-03-01 | End: 2018-03-02

## 2018-03-01 RX ADMIN — Medication 600 MILLIGRAM(S): at 23:38

## 2018-03-01 RX ADMIN — ONDANSETRON 4 MILLIGRAM(S): 8 TABLET, FILM COATED ORAL at 21:46

## 2018-03-01 RX ADMIN — OXYCODONE HYDROCHLORIDE 5 MILLIGRAM(S): 5 TABLET ORAL at 03:30

## 2018-03-01 RX ADMIN — SODIUM CHLORIDE 1000 MILLILITER(S): 9 INJECTION INTRAMUSCULAR; INTRAVENOUS; SUBCUTANEOUS at 06:07

## 2018-03-01 RX ADMIN — HYDROMORPHONE HYDROCHLORIDE 0.5 MILLIGRAM(S): 2 INJECTION INTRAMUSCULAR; INTRAVENOUS; SUBCUTANEOUS at 20:57

## 2018-03-01 RX ADMIN — SODIUM CHLORIDE 1000 MILLILITER(S): 9 INJECTION INTRAMUSCULAR; INTRAVENOUS; SUBCUTANEOUS at 01:04

## 2018-03-01 RX ADMIN — Medication 600 MILLIGRAM(S): at 23:59

## 2018-03-01 RX ADMIN — OXYCODONE HYDROCHLORIDE 5 MILLIGRAM(S): 5 TABLET ORAL at 02:30

## 2018-03-01 RX ADMIN — HYDROMORPHONE HYDROCHLORIDE 0.5 MILLIGRAM(S): 2 INJECTION INTRAMUSCULAR; INTRAVENOUS; SUBCUTANEOUS at 18:06

## 2018-03-01 RX ADMIN — HYDROMORPHONE HYDROCHLORIDE 0.5 MILLIGRAM(S): 2 INJECTION INTRAMUSCULAR; INTRAVENOUS; SUBCUTANEOUS at 18:27

## 2018-03-01 RX ADMIN — HYDROMORPHONE HYDROCHLORIDE 0.5 MILLIGRAM(S): 2 INJECTION INTRAMUSCULAR; INTRAVENOUS; SUBCUTANEOUS at 21:06

## 2018-03-01 NOTE — DISCHARGE NOTE ADULT - PLAN OF CARE
resume normal activity follow up with your obgyn in 1 week. Do not get pregnant for 3 months as you were given methotrexate recently. No heavy lifting. Nothing in the vagina for 2 weeks-no sex, tampons, or baths. Motrin or tylenol as needed for pain. Call your doctor if you have severe pain, heavy bleeding, or fever>100.4

## 2018-03-01 NOTE — DISCHARGE NOTE ADULT - PATIENT PORTAL LINK FT
You can access the [facility], offered by [facility], by registering with the following website: [email or web address]

## 2018-03-01 NOTE — CONSULT NOTE ADULT - ATTENDING COMMENTS
31 yo  s/p IUD mirena with an ectopic pregnancy s/p MTX yesterday with right side pain , non relieve with po medication was advised to do a laparoscopy for possible salpingectomy, possible oopherectomy right or left or possible laparatomy, possible infection, possible transfusion if any heavy bleeding and possible D&C and any other necessary procedures were explained to the patient and  in front of the ER nurse Zuri and also the PGY2 Dr. andrez Raymond.  Pt and  understand the risks and verbalized understanding and signed the consent to proceed w the case.

## 2018-03-01 NOTE — BRIEF OPERATIVE NOTE - PROCEDURE
<<-----Click on this checkbox to enter Procedure Laparoscopic salpingectomy for ectopic pregnancy  03/01/2018    Active  HGOLD2

## 2018-03-01 NOTE — DISCHARGE NOTE ADULT - CARE PLAN
Principal Discharge DX:	Right tubal pregnancy without intrauterine pregnancy  Goal:	resume normal activity  Assessment and plan of treatment:	follow up with your obgyn in 1 week. Do not get pregnant for 3 months as you were given methotrexate recently. No heavy lifting. Nothing in the vagina for 2 weeks-no sex, tampons, or baths. Motrin or tylenol as needed for pain. Call your doctor if you have severe pain, heavy bleeding, or fever>100.4

## 2018-03-01 NOTE — CONSULT NOTE ADULT - SUBJECTIVE AND OBJECTIVE BOX
31yo  at 2w by LMP 2/15 received MTX  at unaffiliated [facility] for pregnancy of unkown location suspicious for ectopic pregnancy presents with severe right lower quadrant pain. Patient was seen in [facility] ED on  for abdominal pain and vaginal bleeding and a newly diagnosed pregnancy in the setting of mirena IUD. TVUS showed no e/o IUP or EUP but mild to moderate peritnoneum concerning for ruptured ectopic. Given patients clinical stability patient was admitted for observation and possible OR. Pt remained stable and was discharged home to follow up with her GYN on . Pt saw her Gyn on  where she had a repeat bhcg and TVUS. Per patient, Bhcg was unchanged, 150, and TVUS showed no e/o IUP or EUP so she received MTX on  at her OBGYNs office. Pt then developed severe RLQ pain that radiates to down her hip and right leg. She called her OBGYN who told her to go to the ED. Patient now reports her pain is 8-9/10 pain. She received tylenol while in the ED but did not get relief        ObHx:   - x2. last delivery 13months ago, she got pregnant while having a Raissa IUD  GynHx: mirena IUD placed april or 2017 pt is unsure. reports h/o ovarian cysts but denies fibroids, polyps, abnormal pap smear, STIs  PMH: denies  PSH: upper jaw surgery , tonsillectomy and adenoidectomy at 4yo  Meds: denies  NKDA Patient seen and evaluated at bedside with Chief resident and attending.    31yo  at 2w by LMP 2/15 received MTX  at un[facility] for pregnancy of unkown location suspicious for ectopic pregnancy presents with severe right lower quadrant pain. Patient was seen in [facility] ED on  for abdominal pain and vaginal bleeding and a newly diagnosed pregnancy in the setting of mirena IUD. TVUS showed no e/o IUP or EUP but mild to moderate peritnoneum concerning for ruptured ectopic. Given patients clinical stability patient was admitted for observation and possible OR. Pt remained stable and was discharged home to follow up with her GYN on . Pt saw her Gyn on  where she had a repeat bhcg and TVUS. Per patient, Bhcg was unchanged, 150, and TVUS showed no e/o IUP or EUP so she received MTX on  at her OBGYNs office. Pt then developed severe R suprapubic pain that radiates to down her hip and right leg. She called her OBGYN who told her to go to the ED. Patient now reports her pain is 8-9/10 pain. She received tylenol while in the ED but did not get relief.    ObHx:   - x2. last delivery 13months ago, she got pregnant while having a Raissa IUD  GynHx: mirena IUD placed april or 2017 pt is unsure. reports h/o ovarian cysts but denies fibroids, polyps, abnormal pap smear, STIs  PMH: denies  PSH: upper jaw surgery , tonsillectomy and adenoidectomy at 6yo  Meds: denies  NKDA    Vital Signs Last 24 Hrs  T(C): 36.9 (01 Mar 2018 01:03), Max: 38 (2018 22:56)  T(F): 98.4 (01 Mar 2018 01:03), Max: 100.4 (2018 22:56)  HR: 87 (01 Mar 2018 01:03) (87 - 90)  BP: 122/79 (01 Mar 2018 01:03) (122/79 - 138/85)  BP(mean): --  RR: 18 (01 Mar 2018 01:03) (18 - 18)  SpO2: 97% (01 Mar 2018 01:03) (97% - 100%)  Gen: NAD  Pulm: nonlabored breathing  Abd: soft, mild tenderness to deep palpation of RLQ, no rebound or guarding  Ext: no calf tenderness or edema bilaterally                          12.6   7.0   )-----------( 199      ( 01 Mar 2018 00:37 )             38.7       138  |  101  |  14  ----------------------------<  114<H>  3.9   |  23  |  0.52    Ca    9.4      01 Mar 2018 00:42    TPro  7.4  /  Alb  4.7  /  TBili  <0.2  /  DBili  x   /  AST  11  /  ALT  8<L>  /  AlkPhos  71      HCG Quantitative, Serum (18 @ 00:42)    HCG Quantitative, Serum: 101.6  HCG Quantitative, Serum (18 @ 20:32)    HCG Quantitative, Serum: 150.4          INTERPRETATION:  PELVIC ULTRASOUND dated 2018 11:35 PM    INDICATION: 32-year-old pregnant female with abdominal pain. Evaluate for   ectopic pregnancy. Treated with methotrexate this afternoon. LMP: 7 weeks   ago  Beta-hC    TECHNIQUE: Transabdominal views of the pelvis were obtained followed by   transvaginal views for better visualization of the ovaries.      PRIOR STUDIES: Pelvic ultrasound dated 2018    FINDINGS:   These images demonstrate the uterus to be anteverted. The uterus is   normal in size and shape.  The uterus is 6.8 x 3.7 x 5.1 cm.  No   myometrial abnormalities are seen.  The endometrium is 0.6 cm in   thickness, which is normal. No intrauterine gestation is identified.    The right ovary is normal in size, measuring 3.1 x 1.5 x 2.4 cm. No right   ovarian masses are seen. The left ovary is normal in size, measuring 3.5   x 1.9 x 1.8 cm. There is a 1.7 cm corpus luteum within the left ovary.   Doppler evaluation demonstrates flow to both ovaries with no evidence of   torsion.    In the right adnexa, there is a complex, heterogeneous mass which is   separate from the right ovary and measures approximately 3.5 x 2.4 x 2.0   cm,  suspicious for an ectopic pregnancy.    A mild-to-moderate amount of complex free fluid is seen in the cul-de-sac.      IMPRESSION:   3.5 cm complex mass in the right adnexa suspicious for an ectopic   pregnancy.    Mild to moderate complex free fluid in the pelvis.    No intrauterine gestation identified. Patient seen and evaluated at bedside with Chief resident and attending.    33yo  at 2w by LMP 2/15 received MTX  at unaffiliated [facility] for pregnancy of unkown location suspicious for ectopic pregnancy presents with severe right lower quadrant pain. Patient was seen in [facility] ED on  for abdominal pain and vaginal bleeding and a newly diagnosed pregnancy in the setting of mirena IUD. TVUS showed no e/o IUP or EUP but mild to moderate peritnoneum concerning for ruptured ectopic. Given patients clinical stability patient was admitted for observation and possible OR. Pt remained stable and was discharged home to follow up with her GYN on . Pt saw her Gyn on  where she had a repeat bhcg and TVUS. Per patient, Bhcg was unchanged, 150, and TVUS showed no e/o IUP or EUP so she received MTX on  at her OBGYNs office. Pt then developed severe R suprapubic pain that radiates to down her hip and right leg. She called her OBGYN who told her to go to the ED. Patient now reports her pain is 8-9/10 pain. She received tylenol while in the ED but did not get relief.    ObHx:   - x2. last delivery 13months ago, she got pregnant while having a Raissa IUD  GynHx: mirena IUD placed april or 2017 pt is unsure. reports h/o ovarian cysts but denies fibroids, polyps, abnormal pap smear, STIs  PMH: denies  PSH: upper jaw surgery , tonsillectomy and adenoidectomy at 4yo  Meds: denies  NKDA    Vital Signs Last 24 Hrs  T(C): 36.9 (01 Mar 2018 01:03), Max: 38 (2018 22:56)  T(F): 98.4 (01 Mar 2018 01:03), Max: 100.4 (2018 22:56)  HR: 87 (01 Mar 2018 01:03) (87 - 90)  BP: 122/79 (01 Mar 2018 01:03) (122/79 - 138/85)  BP(mean): --  RR: 18 (01 Mar 2018 01:03) (18 - 18)  SpO2: 97% (01 Mar 2018 01:03) (97% - 100%)  Gen: NAD  Pulm: nonlabored breathing  Abd: soft, mild tenderness to deep palpation of RLQ, no rebound or guarding  Ext: no calf tenderness or edema bilaterally  : normal appearing external genitalia. on speculum exam, dark bloody mucus in vaginal vault. no active bleeding visualized. cervix appears long and closed. On bimanual exam no tenderness to palpation of b/l adnexa or cervix.                          12.6   7.0   )-----------( 199      ( 01 Mar 2018 00:37 )             38.7       138  |  101  |  14  ----------------------------<  114<H>  3.9   |  23  |  0.52    Ca    9.4      01 Mar 2018 00:42    TPro  7.4  /  Alb  4.7  /  TBili  <0.2  /  DBili  x   /  AST  11  /  ALT  8<L>  /  AlkPhos  71      HCG Quantitative, Serum (18 @ 00:42)    HCG Quantitative, Serum: 101.6  HCG Quantitative, Serum (18 @ 20:32)    HCG Quantitative, Serum: 150.4          INTERPRETATION:  PELVIC ULTRASOUND dated 2018 11:35 PM    INDICATION: 32-year-old pregnant female with abdominal pain. Evaluate for   ectopic pregnancy. Treated with methotrexate this afternoon. LMP: 7 weeks   ago  Beta-hC    TECHNIQUE: Transabdominal views of the pelvis were obtained followed by   transvaginal views for better visualization of the ovaries.      PRIOR STUDIES: Pelvic ultrasound dated 2018    FINDINGS:   These images demonstrate the uterus to be anteverted. The uterus is   normal in size and shape.  The uterus is 6.8 x 3.7 x 5.1 cm.  No   myometrial abnormalities are seen.  The endometrium is 0.6 cm in   thickness, which is normal. No intrauterine gestation is identified.    The right ovary is normal in size, measuring 3.1 x 1.5 x 2.4 cm. No right   ovarian masses are seen. The left ovary is normal in size, measuring 3.5   x 1.9 x 1.8 cm. There is a 1.7 cm corpus luteum within the left ovary.   Doppler evaluation demonstrates flow to both ovaries with no evidence of   torsion.    In the right adnexa, there is a complex, heterogeneous mass which is   separate from the right ovary and measures approximately 3.5 x 2.4 x 2.0   cm,  suspicious for an ectopic pregnancy.    A mild-to-moderate amount of complex free fluid is seen in the cul-de-sac.      IMPRESSION:   3.5 cm complex mass in the right adnexa suspicious for an ectopic   pregnancy.    Mild to moderate complex free fluid in the pelvis.    No intrauterine gestation identified.

## 2018-03-01 NOTE — H&P ADULT - HISTORY OF PRESENT ILLNESS
31yo  at 2w by LMP 2/15 received MTX  at [facility] for pregnancy of unkown location suspicious for ectopic pregnancy presents with severe right lower quadrant pain. Patient was seen in [facility] ED on  for abdominal pain and vaginal bleeding and a newly diagnosed pregnancy in the setting of mirena IUD. TVUS showed no e/o IUP or EUP but mild to moderate peritnoneum concerning for ruptured ectopic. Given patients clinical stability patient was admitted for observation and possible OR. Pt remained stable and was discharged home to follow up with her GYN on . Pt saw her Gyn on  where she had a repeat bhcg and TVUS. Per patient, Bhcg was unchanged, 150, and TVUS showed no e/o IUP or EUP so she received MTX on  at her OBGYNs office. Pt then developed severe R suprapubic pain that radiates to down her hip and right leg. She called her OBGYN who told her to go to the ED. Patient now reports her pain is 8-9/10 pain. She received tylenol while in the ED but did not get relief.    ObHx:   - x2. last delivery 13months ago, she got pregnant while having a Raissa IUD  GynHx: mirena IUD placed april or 2017 pt is unsure. reports h/o ovarian cysts but denies fibroids, polyps, abnormal pap smear, STIs  PMH: denies  PSH: upper jaw surgery , tonsillectomy and adenoidectomy at 6yo  Meds: denies  NKDA

## 2018-03-01 NOTE — ED ADULT NURSE REASSESSMENT NOTE - NS ED NURSE REASSESS COMMENT FT1
received pt in no acute distress. safety maintained. pt requesting information regarding possible surgery r/t ectopic pregnancy. GYN resident was paged. will continue to monitor.

## 2018-03-01 NOTE — H&P ADULT - NSHPPHYSICALEXAM_GEN_ALL_CORE
Vital Signs Last 24 Hrs  T(C): 36.9 (01 Mar 2018 01:03), Max: 38 (28 Feb 2018 22:56)  T(F): 98.4 (01 Mar 2018 01:03), Max: 100.4 (28 Feb 2018 22:56)  HR: 87 (01 Mar 2018 01:03) (87 - 90)  BP: 122/79 (01 Mar 2018 01:03) (122/79 - 138/85)  BP(mean): --  RR: 18 (01 Mar 2018 01:03) (18 - 18)  SpO2: 97% (01 Mar 2018 01:03) (97% - 100%)  Gen: NAD  Pulm: nonlabored breathing  Abd: soft, mild tenderness to deep palpation of RLQ, no rebound or guarding  Ext: no calf tenderness or edema bilaterally  : normal appearing external genitalia. on speculum exam, dark bloody mucus in vaginal vault. no active bleeding visualized. cervix appears long and closed. On bimanual exam no tenderness to palpation of b/l adnexa or cervix.

## 2018-03-01 NOTE — DISCHARGE NOTE ADULT - MEDICATION SUMMARY - MEDICATIONS TO TAKE
I will START or STAY ON the medications listed below when I get home from the hospital:  None I will START or STAY ON the medications listed below when I get home from the hospital:    oxyCODONE-acetaminophen 5 mg-325 mg oral tablet  -- 1 tab(s) by mouth every 4 hours, As needed, Moderate Pain  -- Indication: For pain

## 2018-03-01 NOTE — PROGRESS NOTE ADULT - SUBJECTIVE AND OBJECTIVE BOX
Pt seen at bedside w/ GYN team, pt reports feeling mild improvement with pain when laying down and now s/p oxycodone, physical exam unremarkable, abdomen soft, NTND on deep palpation of RLQ, given suspected ectopic pregnancy with persistent RLQ pain requiring visit to ED twice, pt again counseled on surgical management as option for treatment, she would like to talk to family, will repeat CBC and continue to monitor.

## 2018-03-01 NOTE — DISCHARGE NOTE ADULT - CARE PROVIDER_API CALL
Xiomara Patten), Obstetrics and Gynecology  [address], [place] [address]  Phone: [phone]  Fax: [phone]

## 2018-03-01 NOTE — H&P ADULT - NSHPLABSRESULTS_GEN_ALL_CORE
12.6   7.0   )-----------( 199      ( 01 Mar 2018 00:37 )             38.7       138  |  101  |  14  ----------------------------<  114<H>  3.9   |  23  |  0.52    Ca    9.4      01 Mar 2018 00:42    TPro  7.4  /  Alb  4.7  /  TBili  <0.2  /  DBili  x   /  AST  11  /  ALT  8<L>  /  AlkPhos  71      HCG Quantitative, Serum (18 @ 00:42)    HCG Quantitative, Serum: 101.6  HCG Quantitative, Serum (18 @ 20:32)    HCG Quantitative, Serum: 150.4  INTERPRETATION:  PELVIC ULTRASOUND dated 2018 11:35 PM    INDICATION: 32-year-old pregnant female with abdominal pain. Evaluate for   ectopic pregnancy. Treated with methotrexate this afternoon. LMP: 7 weeks   ago  Beta-hC    TECHNIQUE: Transabdominal views of the pelvis were obtained followed by   transvaginal views for better visualization of the ovaries.      PRIOR STUDIES: Pelvic ultrasound dated 2018    FINDINGS:   These images demonstrate the uterus to be anteverted. The uterus is   normal in size and shape.  The uterus is 6.8 x 3.7 x 5.1 cm.  No   myometrial abnormalities are seen.  The endometrium is 0.6 cm in   thickness, which is normal. No intrauterine gestation is identified.    The right ovary is normal in size, measuring 3.1 x 1.5 x 2.4 cm. No right   ovarian masses are seen. The left ovary is normal in size, measuring 3.5   x 1.9 x 1.8 cm. There is a 1.7 cm corpus luteum within the left ovary.   Doppler evaluation demonstrates flow to both ovaries with no evidence of   torsion.    In the right adnexa, there is a complex, heterogeneous mass which is   separate from the right ovary and measures approximately 3.5 x 2.4 x 2.0   cm,  suspicious for an ectopic pregnancy.    A mild-to-moderate amount of complex free fluid is seen in the cul-de-sac.      IMPRESSION:   3.5 cm complex mass in the right adnexa suspicious for an ectopic   pregnancy.    Mild to moderate complex free fluid in the pelvis.    No intrauterine gestation identified.

## 2018-03-01 NOTE — BRIEF OPERATIVE NOTE - OPERATION/FINDINGS
r. fallopian tube w/ ectopic, dilated tube bleeding from fimbriated end, R paratubal cyst, approximately 500 cc hematoperitoneum

## 2018-03-01 NOTE — H&P ADULT - ASSESSMENT
33yo  at 2w by LMP /15 now day 2 s/p MTX for pregnancy of unknown locations suspicious for ectopic pregnancy presents to ED with severe R suprapubic pain and 3.5cm complex right adnexal cyst suspicious ruptured ectopic pregnancy. Patients Hgb stable from prior admission. Pt is normotensive and w/o tachyardia. However given her c/o severe pain, patient offered diagnostic laparoscopy. Patient consents to diagnostic laparoscopy after full discussion of all risks and benefits to the surgery. Patient voiced understanding will likely perform salpingectomy, possible oophorectomy if indicated, possible conversion to laparotomy. Risks of infection and blood transfusion also discussed. All questions answered. Patient offered to transfer care to her own physician who practices at [facility] but states that she is in agreement with our plan of care and consents to laparoscopy and all associated risks. Patient added on to OR schedule as Class II. NPO.     Plan discussed with Dr. Patten

## 2018-03-01 NOTE — DISCHARGE NOTE ADULT - HOSPITAL COURSE
Patient admitted for ectopic pregnancy and hematoperitoneum. Taken to OR for laparoscopic salpingectomy, evacuation of hematoperitoneum. Vitals stable, ambulating, tolerating regular diet, voiding at time of discharge.

## 2018-03-01 NOTE — CONSULT NOTE ADULT - ASSESSMENT
33yo  at 2w by LMP 2/15 now day 2 s/p MTX for pregnancy of uknown locations suspicious for ectopic pregnancy presents to ED with severe R ruprapubic pain and 3.5cm comple right adnexal cyst suspicious ruptured ectopic pregnancy. Patients Hgb stable from prior admission. Pt is normotensive and w/o tacyardia. However given her c/o severe pain, patient offered diagnostic laparoscopy. Pt reluctant to accept surgery because she does not want to risk loosing her fallopian tube. Pt offered oxycodone 10mg x1 and obersvation in ED for 4 hours to monitor pain and clinical/hemodynamic stability. If patient worsens or pain does not improve, patient will go for diagnostic laparoscopy. If stable and pain improves, will consider discharge to follow up in ED on Day 4 of MTX for repeat Bhcg.    Plan discussed with Dr. Patten

## 2018-03-02 VITALS
SYSTOLIC BLOOD PRESSURE: 106 MMHG | DIASTOLIC BLOOD PRESSURE: 65 MMHG | OXYGEN SATURATION: 99 % | RESPIRATION RATE: 15 BRPM | HEART RATE: 85 BPM | TEMPERATURE: 97 F

## 2018-03-02 LAB
HCT VFR BLD CALC: 37.1 % — SIGNIFICANT CHANGE UP (ref 34.5–45)
HGB BLD-MCNC: 12.4 G/DL — SIGNIFICANT CHANGE UP (ref 11.5–15.5)
MCHC RBC-ENTMCNC: 29.9 PG — SIGNIFICANT CHANGE UP (ref 27–34)
MCHC RBC-ENTMCNC: 33.4 G/DL — SIGNIFICANT CHANGE UP (ref 32–36)
MCV RBC AUTO: 89.4 FL — SIGNIFICANT CHANGE UP (ref 80–100)
PLATELET # BLD AUTO: 196 K/UL — SIGNIFICANT CHANGE UP (ref 150–400)
RBC # BLD: 4.15 M/UL — SIGNIFICANT CHANGE UP (ref 3.8–5.2)
RBC # FLD: 12.9 % — SIGNIFICANT CHANGE UP (ref 10.3–16.9)
WBC # BLD: 8.1 K/UL — SIGNIFICANT CHANGE UP (ref 3.8–10.5)
WBC # FLD AUTO: 8.1 K/UL — SIGNIFICANT CHANGE UP (ref 3.8–10.5)

## 2018-03-02 RX ORDER — DOCUSATE SODIUM 100 MG
100 CAPSULE ORAL
Qty: 0 | Refills: 0 | Status: DISCONTINUED | OUTPATIENT
Start: 2018-03-02 | End: 2018-03-02

## 2018-03-02 RX ORDER — OXYCODONE AND ACETAMINOPHEN 5; 325 MG/1; MG/1
1 TABLET ORAL
Qty: 0 | Refills: 0 | DISCHARGE
Start: 2018-03-02

## 2018-03-02 RX ORDER — SIMETHICONE 80 MG/1
80 TABLET, CHEWABLE ORAL EVERY 8 HOURS
Qty: 0 | Refills: 0 | Status: DISCONTINUED | OUTPATIENT
Start: 2018-03-02 | End: 2018-03-02

## 2018-03-02 RX ADMIN — OXYCODONE AND ACETAMINOPHEN 2 TABLET(S): 5; 325 TABLET ORAL at 14:00

## 2018-03-02 RX ADMIN — OXYCODONE AND ACETAMINOPHEN 2 TABLET(S): 5; 325 TABLET ORAL at 13:35

## 2018-03-02 RX ADMIN — Medication 600 MILLIGRAM(S): at 12:30

## 2018-03-02 RX ADMIN — Medication 600 MILLIGRAM(S): at 06:00

## 2018-03-02 RX ADMIN — SIMETHICONE 80 MILLIGRAM(S): 80 TABLET, CHEWABLE ORAL at 13:35

## 2018-03-02 RX ADMIN — Medication 600 MILLIGRAM(S): at 05:15

## 2018-03-02 RX ADMIN — Medication 600 MILLIGRAM(S): at 12:08

## 2018-03-02 NOTE — PROGRESS NOTE ADULT - SUBJECTIVE AND OBJECTIVE BOX
Patient seen at bedside. Pain is well controlled. Patient is voiding and ambulating but has not yet passed flatus. C/o shoulder pain. Tolerating regular diet. Denies headache, dizziness, nausea/vomiting, shortness of breath, palpitations, heavy bleeding.     Vital Signs Last 24 Hrs  T(C): 36.1 (02 Mar 2018 08:00), Max: 36.6 (01 Mar 2018 21:20)  T(F): 97 (02 Mar 2018 08:00), Max: 97.9 (01 Mar 2018 21:20)  HR: 85 (02 Mar 2018 08:00) (66 - 88)  BP: 106/65 (02 Mar 2018 08:00) (106/63 - 134/71)  BP(mean): 87 (01 Mar 2018 20:00) (79 - 92)  RR: 15 (02 Mar 2018 08:00) (0 - 21)  SpO2: 99% (02 Mar 2018 08:00) (95% - 99%)    Gen: NAD, AO x3  Chest: normal work of breathing  Abdomen: soft, nontender, nondistended, no rebound or guarding  Incision: well approximated, no erythema or drainage  Extremities: no calf tenderness or erythema    I&O's Summary    01 Mar 2018 07:01  -  02 Mar 2018 07:00  --------------------------------------------------------  IN: 2330 mL / OUT: 3295 mL / NET: -965 mL    02 Mar 2018 07:01  -  02 Mar 2018 13:15  --------------------------------------------------------  IN: 240 mL / OUT: 300 mL / NET: -60 mL                          12.4   8.1   )-----------( 196      ( 02 Mar 2018 08:16 )             37.1

## 2018-03-02 NOTE — PROGRESS NOTE ADULT - ASSESSMENT
32y Female POD#1    s/p laparoscopic salpingectomy for r. tubal ectopic, doing well.                                  1. Neuro/Pain:  OPM  2  CV:   VS per routine  3. Pulm: Encourage ISS  4. GI: Reg  5. :  Voiding  6. Heme: Stable  7. ID: --  8. DVT ppx: SCDs  9. Dispo: today

## 2018-03-02 NOTE — PROGRESS NOTE ADULT - SUBJECTIVE AND OBJECTIVE BOX
Patient seen at bedside. Pain is well controlled. Patient is voiding on bedpain but has not yet passed flatus or ambulated. Tolerating regular diet. Denies headache, dizziness, nausea/vomiting, shortness of breath, palpitations, heavy bleeding.     T(C): 36.1 (03-02-18 @ 05:10), Max: 36.9 (03-01-18 @ 10:40)  HR: 88 (03-02-18 @ 05:10) (66 - 88)  BP: 106/63 (03-02-18 @ 05:10) (106/63 - 134/71)  RR: 16 (03-02-18 @ 05:10) (0 - 21)  SpO2: 99% (03-02-18 @ 05:10) (95% - 100%)    Gen: NAD, AO x3  Chest: normal work of breathing  Abdomen: soft, nontender, nondistended, no rebound or guarding  Incision: well approximated, no erythema or drainage  Extremities: no calf tenderness or erythema      03-01-18 @ 07:01  -  03-02-18 @ 07:00  --------------------------------------------------------  IN: 2330 mL / OUT: 3295 mL / NET: -965 mL                          12.4   8.1   )-----------( 196      ( 02 Mar 2018 08:16 )             37.1

## 2018-03-07 LAB — SURGICAL PATHOLOGY STUDY: SIGNIFICANT CHANGE UP

## 2018-03-07 PROCEDURE — 88304 TISSUE EXAM BY PATHOLOGIST: CPT

## 2018-03-07 PROCEDURE — 85025 COMPLETE CBC W/AUTO DIFF WBC: CPT

## 2018-03-07 PROCEDURE — 86900 BLOOD TYPING SEROLOGIC ABO: CPT

## 2018-03-07 PROCEDURE — 88305 TISSUE EXAM BY PATHOLOGIST: CPT

## 2018-03-07 PROCEDURE — 99285 EMERGENCY DEPT VISIT HI MDM: CPT | Mod: 25

## 2018-03-07 PROCEDURE — 76817 TRANSVAGINAL US OBSTETRIC: CPT

## 2018-03-07 PROCEDURE — 80053 COMPREHEN METABOLIC PANEL: CPT

## 2018-03-07 PROCEDURE — 81001 URINALYSIS AUTO W/SCOPE: CPT

## 2018-03-07 PROCEDURE — 84702 CHORIONIC GONADOTROPIN TEST: CPT

## 2018-03-07 PROCEDURE — 85027 COMPLETE CBC AUTOMATED: CPT

## 2018-03-07 PROCEDURE — 85610 PROTHROMBIN TIME: CPT

## 2018-03-07 PROCEDURE — 86901 BLOOD TYPING SEROLOGIC RH(D): CPT

## 2018-03-07 PROCEDURE — 36415 COLL VENOUS BLD VENIPUNCTURE: CPT

## 2018-03-07 PROCEDURE — 86850 RBC ANTIBODY SCREEN: CPT

## 2018-03-07 PROCEDURE — 85730 THROMBOPLASTIN TIME PARTIAL: CPT

## 2018-03-07 PROCEDURE — C1889: CPT

## 2018-03-09 DIAGNOSIS — O08.1 DELAYED OR EXCESSIVE HEMORRHAGE FOLLOWING ECTOPIC AND MOLAR PREGNANCY: ICD-10-CM

## 2018-03-09 DIAGNOSIS — N83.8 OTHER NONINFLAMMATORY DISORDERS OF OVARY, FALLOPIAN TUBE AND BROAD LIGAMENT: ICD-10-CM

## 2018-03-09 DIAGNOSIS — O00.101 RIGHT TUBAL PREGNANCY WITHOUT INTRAUTERINE PREGNANCY: ICD-10-CM

## 2018-03-09 DIAGNOSIS — K66.1 HEMOPERITONEUM: ICD-10-CM

## 2018-04-09 NOTE — PATIENT PROFILE ADULT. - PRO MENTAL HEALTH SX RECENT
Plan: Scarring lower facial “hormonal” acne\\n- discussed that progesterone alone is associated with worsening acne and that a trial off your progesterone cream may be helpful (4 mo) \\n- discussed use of oral medication spironolactone for adult female acne (androgen or “male hormone” blocker)\\n- injected persistent acne cyst today Detail Level: Zone none

## 2018-11-15 ENCOUNTER — EMERGENCY (EMERGENCY)
Facility: HOSPITAL | Age: 33
LOS: 1 days | Discharge: HOME | End: 2018-11-15
Attending: EMERGENCY MEDICINE | Admitting: EMERGENCY MEDICINE
Payer: COMMERCIAL

## 2018-11-15 VITALS
OXYGEN SATURATION: 99 % | HEIGHT: 65 IN | HEART RATE: 74 BPM | TEMPERATURE: 98 F | WEIGHT: 190.04 LBS | RESPIRATION RATE: 18 BRPM | SYSTOLIC BLOOD PRESSURE: 135 MMHG | DIASTOLIC BLOOD PRESSURE: 77 MMHG
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
## 2018-11-15 DIAGNOSIS — Z90.89 ACQUIRED ABSENCE OF OTHER ORGANS: Chronic | ICD-10-CM
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
## 2018-11-15 DIAGNOSIS — S09.90XA UNSPECIFIED INJURY OF HEAD, INITIAL ENCOUNTER: ICD-10-CM
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
## 2018-11-15 DIAGNOSIS — Z79.891 LONG TERM (CURRENT) USE OF OPIATE ANALGESIC: ICD-10-CM

## 2018-11-15 DIAGNOSIS — Z88.8 ALLERGY STATUS TO OTHER DRUGS, MEDICAMENTS AND BIOLOGICAL SUBSTANCES: ICD-10-CM

## 2018-11-15 PROCEDURE — 99283 EMERGENCY DEPT VISIT LOW MDM: CPT

## 2018-11-15 RX ORDER — ACETAMINOPHEN 500 MG
650 TABLET ORAL ONCE
Qty: 0 | Refills: 0 | Status: COMPLETED | OUTPATIENT
Start: 2018-11-15 | End: 2018-11-15

## 2018-11-15 RX ADMIN — Medication 650 MILLIGRAM(S): at 15:39

## 2018-11-15 NOTE — ED PROVIDER NOTE - PHYSICAL EXAMINATION
Constitutional: WDWN resting comfortably in chair; NAD, pleasant  Head: NC/AT  Eyes: PERRL, EOMI, anicteric sclera  ENT: no nasal discharge; uvula midline, no oropharyngeal erythema or exudates; MMM  Neck: supple; no JVD or thyromegaly  Respiratory: CTA B/L; no W/R/R, no retractions  Cardiac: +S1/S2; RRR; no M/R/G  Gastrointestinal: soft, NT/ND; no rebound or guarding; +BSx4  Back: spine midline, no bony tenderness or step-offs; no CVAT B/L  Extremities: WWP, no clubbing or cyanosis; no peripheral edema  Musculoskeletal: NROM x4; no joint swelling, tenderness or erythema  Vascular: 2+ radial, femoral, DP/PT pulses B/L  Dermatologic: top of the scalp with 1 cm in diameter circular erythematous lesion where pt hit her head, tender to palpation, no bleeding or abrasion  Lymphatic: no submandibular or cervical LAD  Neurologic: AAOx3; CNII-XII grossly intact; no focal deficits  Psychiatric: affect and characteristics of appearance, verbalizations, behaviors are appropriate

## 2018-11-15 NOTE — ED ADULT TRIAGE NOTE - CHIEF COMPLAINT QUOTE
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 complains of headache. denies blood thinners. no LOC

## 2018-11-15 NOTE — ED ADULT NURSE NOTE - OBJECTIVE STATEMENT
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 complains of headache. denies blood thinners. no LOC. denies any dizziness, weakness, visual changes. PERRLA noted.

## 2018-11-15 NOTE — ED PROVIDER NOTE - OBJECTIVE STATEMENT
33F with no PMHx presenting to ED for head injury at work. 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 No LOC at the time. Pt reports she initially saw "stars" but only lasted for 1-2 minutes. Currently only endorses lightheadedness and achy headache on the top of her head midline. Denies any bleeding, nausea/vomiting, blurry or double vision, vertigo symptoms, faintness, weakness, numbness, or tingling, confusion, memory loss, photosensitivity, halos or flashes of light in visual fields. Other ROS negative.

## 2018-11-15 NOTE — ED ADULT NURSE NOTE - CHIEF COMPLAINT QUOTE
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 complains of headache. denies blood thinners. no LOC

## 2018-11-15 NOTE — ED PROVIDER NOTE - ATTENDING CONTRIBUTION TO CARE
hit top of head on stationary object, felt dazed/ saw spots but no loc.  symptoms subsided and now with mild headache, otherwise asymptomatic.  CN 2-12 intact, finger to nose normal, speech normal, gait normal, rhomberg neg.  no blood thinner use.  Withams ct head neg, will not image.  tylenol/ motrin prn.  head injury precautions discussed

## 2018-11-20 NOTE — ED ADULT NURSE NOTE - PSH
DATE OF PROCEDURE:  02/20/2018      SURGEON:  Ja Gonsalez M.D. PREOPERATIVE DIAGNOSIS:  Bilateral hydronephrosis. POSTOPERATIVE DIAGNOSIS:  Bilateral hydronephrosis. PROCEDURES PERFORMED:  Cystoscopy, bilateral retrograde pyelogram, and exchange   of the bilateral ureteral stents. ANESTHESIA:  General.      ESTIMATED BLOOD LOSS:  Minimal.      DESCRIPTION OF PROCEDURE:  The patient was brought to the operating table and   was placed in dorsal lithotomy position. The patient's vaginal and perineal   areas were prepped and draped in the usual sterile fashion. A 22-Brazilian   cystoscope was placed into the patient's bladder without difficulty. After   engaging the patient's right ureteral stent with a flexible grasper, the stent   was pulled out to the level of the urethral meatus. A guidewire was then   advanced through the ureteral stent into the renal pelvis. This was followed by   advancement of a 5-Brazilian open-ended catheter and performing retrograde   pyelogram within the patient's right collecting system. Injection of the   contrast demonstrated persistent dilation of the proximal ureter as well as   renal pelvis. Prolonged observation with fluoroscopy demonstrated no evidence   of drainage from the patient's right collecting system. At this point, we   elected to replace a 6-Brazilian x 24 cm ureteral stent. The stent was advanced   over the guidewire under fluoroscopic view and after proper position was   confirmed, we deployed the stent by removing the guidewire. We turned our attention to the patient's contralateral side. Exactly the same   procedure was repeated on the patient's left side. The retrograde pyelogram   once again demonstrated persistent dilation with minimal drainage, which   prompted replacement of the ureteral stent using exactly the same technique as   the contralateral side.       After the bilateral retrograde pyelogram, confirmed the presence of bilateral hydronephrosis, which prompted exchange of the bilateral ureteral stents, the   patient's bladder was drained through the   cystoscope and she was taken down from the operating table and was transferred   to the recovery room in a stable condition.          _____________________               ____________________________________   HEATHER Grullon/HQYM-#[id number]   DD:  02/27/2018 08:28:10      DT:  02/27/2018 10:33:37            [address]      REPORT OF OPERATION        Shirin Quiroz   MRN#: [id number]   ROOM:   [facility]#: [[id number]] Reports History of tonsillectomy

## 2019-08-02 ENCOUNTER — NON-APPOINTMENT (OUTPATIENT)
Age: 34
End: 2019-08-02

## 2019-08-02 ENCOUNTER — TRANSCRIPTION ENCOUNTER (OUTPATIENT)
Age: 34
End: 2019-08-02

## 2019-08-02 ENCOUNTER — APPOINTMENT (OUTPATIENT)
Dept: OPHTHALMOLOGY | Facility: CLINIC | Age: 34
End: 2019-08-02
Payer: COMMERCIAL

## 2019-08-02 PROCEDURE — 99203 OFFICE O/P NEW LOW 30 MIN: CPT

## 2020-04-26 ENCOUNTER — MESSAGE (OUTPATIENT)
Age: 35
End: 2020-04-26

## 2020-05-06 LAB
SARS-COV-2 IGG SERPL IA-ACNC: <0.1 INDEX
SARS-COV-2 IGG SERPL QL IA: NEGATIVE

## 2020-05-18 NOTE — PATIENT PROFILE ADULT. - NS TRANSFER DISPOSITION PATIENT BELONGINGS
Phone  VISIT POSTPARTUM NOTE    History of Present Illness:  This is a 27 year old       who presents today for her 6 week postpartum exam.  Her pregnancy was uncomplicated  She had a primary  birth of a twins on it 2020 she also had bilateral salpingectomy performed..   This birth was uncomplicated.    She is currently breast/  bottle.  Her bleeding has stopped.   She has been afebrile.  She has not experienced symptoms of postpartum depression.  She has not had intercourse since delivery.  She plans to use  sterilization for birth control.      RAD (reactive airway disease)                                   Comment: as child    VITALS: Reports afebrile  GEN: She is alert, nontoxic with fluent speech      ASSESSMENT/PLAN:    No diagnosis found.     No orders of the defined types were placed in this encounter.      No follow-ups on file.     This visit was performed via live interactive two-way phone.    Clinician Location: [facility]   Patient Location: Home  
with patient

## 2020-07-20 ENCOUNTER — TRANSCRIPTION ENCOUNTER (OUTPATIENT)
Age: 35
End: 2020-07-20

## 2021-06-20 ENCOUNTER — TRANSCRIPTION ENCOUNTER (OUTPATIENT)
Age: 36
End: 2021-06-20

## 2022-04-06 ENCOUNTER — APPOINTMENT (OUTPATIENT)
Dept: OPHTHALMOLOGY | Facility: CLINIC | Age: 37
End: 2022-04-06
Payer: COMMERCIAL

## 2022-04-06 ENCOUNTER — NON-APPOINTMENT (OUTPATIENT)
Age: 37
End: 2022-04-06

## 2022-04-06 PROCEDURE — 92012 INTRM OPH EXAM EST PATIENT: CPT

## 2022-04-11 ENCOUNTER — APPOINTMENT (OUTPATIENT)
Dept: OPHTHALMOLOGY | Facility: CLINIC | Age: 37
End: 2022-04-11
Payer: COMMERCIAL

## 2022-04-11 ENCOUNTER — TRANSCRIPTION ENCOUNTER (OUTPATIENT)
Age: 37
End: 2022-04-11

## 2022-04-11 ENCOUNTER — NON-APPOINTMENT (OUTPATIENT)
Age: 37
End: 2022-04-11

## 2022-04-11 PROCEDURE — 92012 INTRM OPH EXAM EST PATIENT: CPT

## 2022-06-14 ENCOUNTER — OUTPATIENT (OUTPATIENT)
Dept: OUTPATIENT SERVICES | Facility: HOSPITAL | Age: 37
LOS: 1 days | End: 2022-06-14

## 2022-06-14 DIAGNOSIS — Z90.89 ACQUIRED ABSENCE OF OTHER ORGANS: Chronic | ICD-10-CM

## 2022-06-14 DIAGNOSIS — Z00.00 ENCOUNTER FOR GENERAL ADULT MEDICAL EXAMINATION WITHOUT ABNORMAL FINDINGS: ICD-10-CM

## 2022-10-06 ENCOUNTER — APPOINTMENT (OUTPATIENT)
Dept: GASTROENTEROLOGY | Facility: CLINIC | Age: 37
End: 2022-10-06

## 2022-10-06 VITALS
DIASTOLIC BLOOD PRESSURE: 83 MMHG | WEIGHT: 194 LBS | TEMPERATURE: 98.2 F | OXYGEN SATURATION: 100 % | HEART RATE: 90 BPM | SYSTOLIC BLOOD PRESSURE: 128 MMHG | HEIGHT: 66 IN | BODY MASS INDEX: 31.18 KG/M2

## 2022-10-06 DIAGNOSIS — R19.7 DIARRHEA, UNSPECIFIED: ICD-10-CM

## 2022-10-06 DIAGNOSIS — R14.0 ABDOMINAL DISTENSION (GASEOUS): ICD-10-CM

## 2022-10-06 PROCEDURE — 99204 OFFICE O/P NEW MOD 45 MIN: CPT

## 2022-10-06 RX ORDER — METFORMIN HYDROCHLORIDE 1000 MG/1
1000 TABLET, COATED ORAL
Refills: 0 | Status: ACTIVE | COMMUNITY

## 2022-10-06 RX ORDER — UBIDECARENONE/VIT E ACET 100MG-5
CAPSULE ORAL
Refills: 0 | Status: ACTIVE | COMMUNITY

## 2022-10-06 RX ORDER — ETONOGESTREL AND ETHINYL ESTRADIOL .12; .015 MG/D; MG/D
0.12-0.015 INSERT, EXTENDED RELEASE VAGINAL
Refills: 0 | Status: ACTIVE | COMMUNITY

## 2022-10-06 RX ORDER — TOPIRAMATE 50 MG/1
50 TABLET, FILM COATED ORAL
Refills: 0 | Status: ACTIVE | COMMUNITY

## 2022-10-06 NOTE — ASSESSMENT
[FreeTextEntry1] : Impression:\par #Diarrhea - D/dx includes IBS but given elevated CRP cannot r/o IBD. \par \par Plan:\par - Schedule colonoscopy for diarrhea. Bx TI and random colon biopsies. \par - Risks (including anesthesia complications, bleeding, infection, perforation) as well as benefits, alternatives, and details of procedure discussed w/ patient.\par - Prep instructions discussed at length and written materials provided.\par - Suprep prep ordered.\par - COVID swab ordered.\par - Need for chaperone discussed.\par - Stool O&P\par \par \par

## 2022-10-06 NOTE — HISTORY OF PRESENT ILLNESS
[FreeTextEntry1] : BRENNAN LLOYD is a 37 year F here for diarrhea. She has a history of IBS dx as a teenager, but not a lot of work-up was done. \par \par GI History: Every 2 weeks has 7 episodes of oily greasy diarrhea w/ lots of abd pain. Lots of urgency. Lots of abd cramping -> slowly improves after BM. \par Has been much worse since getting March/April (since getting COVID booster).\par Prior to this would occasionally have diarrhea, but much less common and less severe\par Has cut out dairy, spicy foods, oily foods without improvement.\par Worst in the AM - 4 times very 30 mins after waking, then slows down. No nocturnal symptoms (maybe rarely).\par \par No blood.\par Weight gain, started on metformin/topiramate has lost 13 lbs over 3 months. \par Is not having issues w/ eating.\par \par GI ROS negative for fevers/chills, dysphagia, reflux, nausea, vomiting, early satiety, constipation, melena, hematochezia. Patient denies NSAID use. Patient denies known family history of GI cancers.\par \par Current Meds: Metformin, Topirate, nuva ring, VB12 weekly injection \par All: Spironolactone -> hives\par FHx: No GI cancers\par SHx: Non-smoker, occasional EtOH. \par \par Relevant Exam:\par Benign\par \par Labs:\par CRP and ferritin elevated. No anemia noted on CBC (in HIE system). Celiac serologies negative.

## 2022-10-22 ENCOUNTER — NON-APPOINTMENT (OUTPATIENT)
Age: 37
End: 2022-10-22

## 2022-11-23 ENCOUNTER — APPOINTMENT (OUTPATIENT)
Age: 37
End: 2022-11-23

## 2022-11-23 ENCOUNTER — RESULT REVIEW (OUTPATIENT)
Age: 37
End: 2022-11-23

## 2022-11-23 PROCEDURE — 43239 EGD BIOPSY SINGLE/MULTIPLE: CPT

## 2022-11-23 PROCEDURE — 45380 COLONOSCOPY AND BIOPSY: CPT | Mod: 59

## 2022-11-23 PROCEDURE — 45385 COLONOSCOPY W/LESION REMOVAL: CPT

## 2023-03-20 ENCOUNTER — NON-APPOINTMENT (OUTPATIENT)
Age: 38
End: 2023-03-20

## 2023-12-27 ENCOUNTER — TRANSCRIPTION ENCOUNTER (OUTPATIENT)
Age: 38
End: 2023-12-27

## 2024-01-01 NOTE — ED ADULT NURSE NOTE - PAIN: PRESENCE, MLM
Patient afebrile this shift. Voids and stools. Bonding well with both mother and father; both respond to infant cues and participate in infant care. Feeding without difficulty. Vital signs stable at this time. Call light placed within parent reach, encouraged use if needed.     complains of pain/discomfort

## 2024-01-13 ENCOUNTER — NON-APPOINTMENT (OUTPATIENT)
Age: 39
End: 2024-01-13

## 2024-02-08 ENCOUNTER — TRANSCRIPTION ENCOUNTER (OUTPATIENT)
Age: 39
End: 2024-02-08

## 2024-02-09 ENCOUNTER — TRANSCRIPTION ENCOUNTER (OUTPATIENT)
Age: 39
End: 2024-02-09

## 2024-02-09 ENCOUNTER — INPATIENT (INPATIENT)
Facility: HOSPITAL | Age: 39
LOS: 0 days | Discharge: HOME | DRG: 819 | End: 2024-02-10
Attending: OBSTETRICS & GYNECOLOGY | Admitting: OBSTETRICS & GYNECOLOGY
Payer: COMMERCIAL

## 2024-02-09 VITALS
SYSTOLIC BLOOD PRESSURE: 137 MMHG | OXYGEN SATURATION: 98 % | HEART RATE: 98 BPM | HEIGHT: 66 IN | RESPIRATION RATE: 16 BRPM | TEMPERATURE: 98 F | WEIGHT: 164.91 LBS | DIASTOLIC BLOOD PRESSURE: 77 MMHG

## 2024-02-09 DIAGNOSIS — O00.80 OTHER ECTOPIC PREGNANCY WITHOUT INTRAUTERINE PREGNANCY: ICD-10-CM

## 2024-02-09 DIAGNOSIS — Z88.8 ALLERGY STATUS TO OTHER DRUGS, MEDICAMENTS AND BIOLOGICAL SUBSTANCES: ICD-10-CM

## 2024-02-09 DIAGNOSIS — Z90.89 ACQUIRED ABSENCE OF OTHER ORGANS: Chronic | ICD-10-CM

## 2024-02-09 LAB
ALBUMIN SERPL ELPH-MCNC: 3.5 G/DL — SIGNIFICANT CHANGE UP (ref 3.3–5)
ALP SERPL-CCNC: 47 U/L — SIGNIFICANT CHANGE UP (ref 40–120)
ALT FLD-CCNC: 13 U/L — SIGNIFICANT CHANGE UP (ref 10–45)
ANION GAP SERPL CALC-SCNC: 10 MMOL/L — SIGNIFICANT CHANGE UP (ref 5–17)
APPEARANCE UR: CLEAR — SIGNIFICANT CHANGE UP
AST SERPL-CCNC: 12 U/L — SIGNIFICANT CHANGE UP (ref 10–40)
BACTERIA # UR AUTO: ABNORMAL /HPF
BASOPHILS # BLD AUTO: 0.04 K/UL — SIGNIFICANT CHANGE UP (ref 0–0.2)
BASOPHILS NFR BLD AUTO: 0.3 % — SIGNIFICANT CHANGE UP (ref 0–2)
BILIRUB SERPL-MCNC: 0.4 MG/DL — SIGNIFICANT CHANGE UP (ref 0.2–1.2)
BILIRUB UR-MCNC: NEGATIVE — SIGNIFICANT CHANGE UP
BLD GP AB SCN SERPL QL: NEGATIVE — SIGNIFICANT CHANGE UP
BUN SERPL-MCNC: 16 MG/DL — SIGNIFICANT CHANGE UP (ref 7–23)
CALCIUM SERPL-MCNC: 8.2 MG/DL — LOW (ref 8.4–10.5)
CAST: 5 /LPF — HIGH (ref 0–4)
CHLORIDE SERPL-SCNC: 106 MMOL/L — SIGNIFICANT CHANGE UP (ref 96–108)
CO2 SERPL-SCNC: 21 MMOL/L — LOW (ref 22–31)
COLOR SPEC: YELLOW — SIGNIFICANT CHANGE UP
CREAT SERPL-MCNC: 0.6 MG/DL — SIGNIFICANT CHANGE UP (ref 0.5–1.3)
DIFF PNL FLD: NEGATIVE — SIGNIFICANT CHANGE UP
EGFR: 119 ML/MIN/1.73M2 — SIGNIFICANT CHANGE UP
EOSINOPHIL # BLD AUTO: 0 K/UL — SIGNIFICANT CHANGE UP (ref 0–0.5)
EOSINOPHIL NFR BLD AUTO: 0 % — SIGNIFICANT CHANGE UP (ref 0–6)
GLUCOSE SERPL-MCNC: 131 MG/DL — HIGH (ref 70–99)
GLUCOSE UR QL: NEGATIVE MG/DL — SIGNIFICANT CHANGE UP
HCG SERPL-ACNC: 749 MIU/ML — HIGH
HCT VFR BLD CALC: 30.8 % — LOW (ref 34.5–45)
HCT VFR BLD CALC: 33 % — LOW (ref 34.5–45)
HGB BLD-MCNC: 10 G/DL — LOW (ref 11.5–15.5)
HGB BLD-MCNC: 10.9 G/DL — LOW (ref 11.5–15.5)
IMM GRANULOCYTES NFR BLD AUTO: 0.3 % — SIGNIFICANT CHANGE UP (ref 0–0.9)
INR BLD: 1.03 — SIGNIFICANT CHANGE UP (ref 0.85–1.18)
KETONES UR-MCNC: 80 MG/DL
LEUKOCYTE ESTERASE UR-ACNC: NEGATIVE — SIGNIFICANT CHANGE UP
LIDOCAIN IGE QN: 23 U/L — SIGNIFICANT CHANGE UP (ref 7–60)
LYMPHOCYTES # BLD AUTO: 1.36 K/UL — SIGNIFICANT CHANGE UP (ref 1–3.3)
LYMPHOCYTES # BLD AUTO: 9.5 % — LOW (ref 13–44)
MCHC RBC-ENTMCNC: 30 PG — SIGNIFICANT CHANGE UP (ref 27–34)
MCHC RBC-ENTMCNC: 30.4 PG — SIGNIFICANT CHANGE UP (ref 27–34)
MCHC RBC-ENTMCNC: 32.5 GM/DL — SIGNIFICANT CHANGE UP (ref 32–36)
MCHC RBC-ENTMCNC: 33 GM/DL — SIGNIFICANT CHANGE UP (ref 32–36)
MCV RBC AUTO: 92.2 FL — SIGNIFICANT CHANGE UP (ref 80–100)
MCV RBC AUTO: 92.5 FL — SIGNIFICANT CHANGE UP (ref 80–100)
MONOCYTES # BLD AUTO: 0.49 K/UL — SIGNIFICANT CHANGE UP (ref 0–0.9)
MONOCYTES NFR BLD AUTO: 3.4 % — SIGNIFICANT CHANGE UP (ref 2–14)
MUCOUS THREADS # UR AUTO: PRESENT
NEUTROPHILS # BLD AUTO: 12.4 K/UL — HIGH (ref 1.8–7.4)
NEUTROPHILS NFR BLD AUTO: 86.5 % — HIGH (ref 43–77)
NITRITE UR-MCNC: NEGATIVE — SIGNIFICANT CHANGE UP
NRBC # BLD: 0 /100 WBCS — SIGNIFICANT CHANGE UP (ref 0–0)
NRBC # BLD: 0 /100 WBCS — SIGNIFICANT CHANGE UP (ref 0–0)
PH UR: 5.5 — SIGNIFICANT CHANGE UP (ref 5–8)
PLATELET # BLD AUTO: 266 K/UL — SIGNIFICANT CHANGE UP (ref 150–400)
PLATELET # BLD AUTO: 267 K/UL — SIGNIFICANT CHANGE UP (ref 150–400)
POTASSIUM SERPL-MCNC: 4.3 MMOL/L — SIGNIFICANT CHANGE UP (ref 3.5–5.3)
POTASSIUM SERPL-SCNC: 4.3 MMOL/L — SIGNIFICANT CHANGE UP (ref 3.5–5.3)
PROT SERPL-MCNC: 5.6 G/DL — LOW (ref 6–8.3)
PROT UR-MCNC: 30 MG/DL
PROTHROM AB SERPL-ACNC: 11.7 SEC — SIGNIFICANT CHANGE UP (ref 9.5–13)
RAPID RVP RESULT: SIGNIFICANT CHANGE UP
RBC # BLD: 3.33 M/UL — LOW (ref 3.8–5.2)
RBC # BLD: 3.58 M/UL — LOW (ref 3.8–5.2)
RBC # FLD: 12.8 % — SIGNIFICANT CHANGE UP (ref 10.3–14.5)
RBC # FLD: 12.9 % — SIGNIFICANT CHANGE UP (ref 10.3–14.5)
RBC CASTS # UR COMP ASSIST: 3 /HPF — SIGNIFICANT CHANGE UP (ref 0–4)
RH IG SCN BLD-IMP: POSITIVE — SIGNIFICANT CHANGE UP
RH IG SCN BLD-IMP: POSITIVE — SIGNIFICANT CHANGE UP
SARS-COV-2 RNA SPEC QL NAA+PROBE: SIGNIFICANT CHANGE UP
SODIUM SERPL-SCNC: 137 MMOL/L — SIGNIFICANT CHANGE UP (ref 135–145)
SP GR SPEC: 1.03 — SIGNIFICANT CHANGE UP (ref 1–1.03)
SQUAMOUS # UR AUTO: 10 /HPF — HIGH (ref 0–5)
UROBILINOGEN FLD QL: 0.2 MG/DL — SIGNIFICANT CHANGE UP (ref 0.2–1)
WBC # BLD: 14.34 K/UL — HIGH (ref 3.8–10.5)
WBC # BLD: 9.32 K/UL — SIGNIFICANT CHANGE UP (ref 3.8–10.5)
WBC # FLD AUTO: 14.34 K/UL — HIGH (ref 3.8–10.5)
WBC # FLD AUTO: 9.32 K/UL — SIGNIFICANT CHANGE UP (ref 3.8–10.5)
WBC UR QL: 6 /HPF — HIGH (ref 0–5)

## 2024-02-09 PROCEDURE — 76817 TRANSVAGINAL US OBSTETRIC: CPT | Mod: 26

## 2024-02-09 PROCEDURE — 88304 TISSUE EXAM BY PATHOLOGIST: CPT | Mod: 26

## 2024-02-09 PROCEDURE — 99285 EMERGENCY DEPT VISIT HI MDM: CPT

## 2024-02-09 PROCEDURE — 71045 X-RAY EXAM CHEST 1 VIEW: CPT | Mod: 26

## 2024-02-09 PROCEDURE — 76801 OB US < 14 WKS SINGLE FETUS: CPT | Mod: 26

## 2024-02-09 PROCEDURE — 88305 TISSUE EXAM BY PATHOLOGIST: CPT | Mod: 26

## 2024-02-09 PROCEDURE — 93010 ELECTROCARDIOGRAM REPORT: CPT

## 2024-02-09 DEVICE — SURGICEL POWDER 3 GRAMS: Type: IMPLANTABLE DEVICE | Status: FUNCTIONAL

## 2024-02-09 RX ORDER — HYDROMORPHONE HYDROCHLORIDE 2 MG/ML
0.5 INJECTION INTRAMUSCULAR; INTRAVENOUS; SUBCUTANEOUS
Refills: 0 | Status: DISCONTINUED | OUTPATIENT
Start: 2024-02-09 | End: 2024-02-10

## 2024-02-09 RX ORDER — METOCLOPRAMIDE HCL 10 MG
10 TABLET ORAL EVERY 6 HOURS
Refills: 0 | Status: DISCONTINUED | OUTPATIENT
Start: 2024-02-09 | End: 2024-02-10

## 2024-02-09 RX ORDER — MORPHINE SULFATE 50 MG/1
4 CAPSULE, EXTENDED RELEASE ORAL ONCE
Refills: 0 | Status: DISCONTINUED | OUTPATIENT
Start: 2024-02-09 | End: 2024-02-09

## 2024-02-09 RX ORDER — KETOROLAC TROMETHAMINE 30 MG/ML
30 SYRINGE (ML) INJECTION EVERY 6 HOURS
Refills: 0 | Status: DISCONTINUED | OUTPATIENT
Start: 2024-02-09 | End: 2024-02-10

## 2024-02-09 RX ORDER — OXYCODONE HYDROCHLORIDE 5 MG/1
10 TABLET ORAL EVERY 6 HOURS
Refills: 0 | Status: DISCONTINUED | OUTPATIENT
Start: 2024-02-09 | End: 2024-02-10

## 2024-02-09 RX ORDER — SODIUM CHLORIDE 9 MG/ML
1000 INJECTION, SOLUTION INTRAVENOUS
Refills: 0 | Status: DISCONTINUED | OUTPATIENT
Start: 2024-02-09 | End: 2024-02-10

## 2024-02-09 RX ORDER — ACETAMINOPHEN 500 MG
650 TABLET ORAL ONCE
Refills: 0 | Status: COMPLETED | OUTPATIENT
Start: 2024-02-09 | End: 2024-02-09

## 2024-02-09 RX ORDER — SODIUM CHLORIDE 9 MG/ML
1000 INJECTION INTRAMUSCULAR; INTRAVENOUS; SUBCUTANEOUS ONCE
Refills: 0 | Status: COMPLETED | OUTPATIENT
Start: 2024-02-09 | End: 2024-02-09

## 2024-02-09 RX ORDER — IOHEXOL 300 MG/ML
30 INJECTION, SOLUTION INTRAVENOUS ONCE
Refills: 0 | Status: COMPLETED | OUTPATIENT
Start: 2024-02-09 | End: 2024-02-09

## 2024-02-09 RX ORDER — ONDANSETRON 8 MG/1
8 TABLET, FILM COATED ORAL EVERY 6 HOURS
Refills: 0 | Status: DISCONTINUED | OUTPATIENT
Start: 2024-02-09 | End: 2024-02-10

## 2024-02-09 RX ORDER — PANTOPRAZOLE SODIUM 20 MG/1
20 TABLET, DELAYED RELEASE ORAL DAILY
Refills: 0 | Status: DISCONTINUED | OUTPATIENT
Start: 2024-02-09 | End: 2024-02-10

## 2024-02-09 RX ORDER — SIMETHICONE 80 MG/1
80 TABLET, CHEWABLE ORAL EVERY 6 HOURS
Refills: 0 | Status: DISCONTINUED | OUTPATIENT
Start: 2024-02-09 | End: 2024-02-10

## 2024-02-09 RX ORDER — ACETAMINOPHEN 500 MG
1000 TABLET ORAL EVERY 6 HOURS
Refills: 0 | Status: DISCONTINUED | OUTPATIENT
Start: 2024-02-09 | End: 2024-02-10

## 2024-02-09 RX ORDER — FAMOTIDINE 10 MG/ML
20 INJECTION INTRAVENOUS ONCE
Refills: 0 | Status: COMPLETED | OUTPATIENT
Start: 2024-02-09 | End: 2024-02-09

## 2024-02-09 RX ORDER — OXYCODONE HYDROCHLORIDE 5 MG/1
5 TABLET ORAL EVERY 6 HOURS
Refills: 0 | Status: DISCONTINUED | OUTPATIENT
Start: 2024-02-09 | End: 2024-02-10

## 2024-02-09 RX ADMIN — MORPHINE SULFATE 4 MILLIGRAM(S): 50 CAPSULE, EXTENDED RELEASE ORAL at 11:53

## 2024-02-09 RX ADMIN — IOHEXOL 30 MILLILITER(S): 300 INJECTION, SOLUTION INTRAVENOUS at 11:45

## 2024-02-09 RX ADMIN — SODIUM CHLORIDE 1000 MILLILITER(S): 9 INJECTION INTRAMUSCULAR; INTRAVENOUS; SUBCUTANEOUS at 11:53

## 2024-02-09 RX ADMIN — Medication 650 MILLIGRAM(S): at 17:14

## 2024-02-09 RX ADMIN — Medication 650 MILLIGRAM(S): at 17:12

## 2024-02-09 RX ADMIN — Medication 30 MILLIGRAM(S): at 23:13

## 2024-02-09 RX ADMIN — MORPHINE SULFATE 4 MILLIGRAM(S): 50 CAPSULE, EXTENDED RELEASE ORAL at 14:57

## 2024-02-09 RX ADMIN — SODIUM CHLORIDE 1000 MILLILITER(S): 9 INJECTION INTRAMUSCULAR; INTRAVENOUS; SUBCUTANEOUS at 17:05

## 2024-02-09 RX ADMIN — FAMOTIDINE 20 MILLIGRAM(S): 10 INJECTION INTRAVENOUS at 11:52

## 2024-02-09 RX ADMIN — MORPHINE SULFATE 4 MILLIGRAM(S): 50 CAPSULE, EXTENDED RELEASE ORAL at 15:19

## 2024-02-09 NOTE — ED PROVIDER NOTE - CLINICAL SUMMARY MEDICAL DECISION MAKING FREE TEXT BOX
38F c no PMH, PSH of tonsillectomy and surgical removal of ectopic pregnancy p/w 1 day History of abdominal pain, diarrhea, and nonbloody nonbilious nausea and vomiting. On exam,  vital signs are within normal limits, diffuse abdominal tenderness to palpation with no rebound or guarding.     ddx: adverse reaction to Ozempic vs electrolyte abnormality vs sbo vs pancreatitis vs pregnancy. Syncope likely in setting of nausea vomiting and diarrhea however will order EKG   To evaluate for arrhythmia.    Plan:   -   EKG pending  -  labs pending  -  UA pending  -  RVP pending  - cxr: INTERPRETATION:  Clinical history reason for exam: Pain.    Frontal chest, low lung volumes.    No comparison.    Findings/  impression: Heart, lungs, mediastinum and thorax are unremarkable. I did not visualize free air on cxr  - hcg pending  - ivf and moprhine, pt declines nausea medication  - General surgery evaluated patient at bedside, recommend CT abdomen pelvis with oral and IV contrast which is ordered.

## 2024-02-09 NOTE — ED ADULT TRIAGE NOTE - CHIEF COMPLAINT QUOTE
Pt presents to the ED for lower abd pain since last night with multiple episodes of vomiting and diarrhea. No coffee ground emesis or BRB in vomit. Pt endorses synopsizing 2 times this morning. No head strike or blood thinner use. Pt received 1L NS by EMS. Pt presents to the ED for lower abd pain since last night with multiple episodes of vomiting and diarrhea. No coffee ground emesis or BRB in vomit. Pt endorses synopsizing 2 times this morning. No head strike or blood thinner use. Pt received 1L NS by EMS. Hx

## 2024-02-09 NOTE — CONSULT NOTE ADULT - ASSESSMENT
38F w hx of ectopic pregnancy in the past presents with one day hx of diffuse abdominal pain, nausea, nbnb emesis x2, diarrhea x2, and syncope x4 (no headstrike, passed out on bed). She denies fever, chills, chest pain, sob, vaginal bleeding, abnormal discharge, constipation. In ED, VSS, Hg 10, beta-hcg inc to 749. Ultrasound suspicious for pregnancy of unknown origin.     Team 4 to continue to follow

## 2024-02-09 NOTE — ED ADULT NURSE NOTE - PAIN: PRESENCE, MLM
complains of pain/discomfort Glycopyrrolate Pregnancy And Lactation Text: This medication is Pregnancy Category B and is considered safe during pregnancy. It is unknown if it is excreted breast milk.

## 2024-02-09 NOTE — BRIEF OPERATIVE NOTE - OPERATION/FINDINGS
Hysteroscopy D+C performed without difficulty. Nuvaring removed. Abdomen entered via veress through the umbilicus. Two additional ports placed under direct visualization. Hemoperitoneum found in abdomen. Hemoperitoneum evacuated. Left cornual ectopic noted and resected. Left salpingectomy performed. Uterus repaired at cornua with v-lock. EBL 1200 IVF 3000

## 2024-02-09 NOTE — H&P ADULT - HISTORY OF PRESENT ILLNESS
38 y.o.  LMP 3 weeks ago presenting for abdominal pain, nausea, vomiting, syncope x4 in the setting of a positive beta hcg. Patient reports she had acute onset pain yesterday night that was 8/10 in the LLQ. She had one episode of emesis at that time. She did not take advil as she was worried about bleeding and did not have access to tylenol. Over the course of the evening she got little sleep and had one more episode of emesis. Ultimately, the patient tried to get up this morning and felt she had 4 syncopal episodes where she felt lightheaded and dizzy. Pt reports she did not hit her head at  any point. She also endorses 2-4 episodes of diarrhea over the last two days. She denies fever, chills, chest pain, sob, vaginal bleeding, abnormal discharge, constipation, diarrhea.    OBHx:   G1:    G2:    G3: 2019 ectopic with salpingectomy (pt unsure of side)  G4::    GYN Hx: denies STD/STI, cysts, fibroids, abnormal pap, has nuvaring in place  PMHx: denies  SHx: L/S salpingectomy for ruptured ectopic in 2019  Medications ozempic every other week  Allergies: NKDA    PHYSICAL EXAM:   Vital Signs Last 24 Hrs  T(C): 36.8 (2024 14:12), Max: 36.8 (2024 14:12)  T(F): 98.2 (2024 14:12), Max: 98.2 (2024 14:12)  HR: 90 (2024 14:12) (90 - 98)  BP: 113/75 (2024 14:12) (113/75 - 137/77)  BP(mean): --  RR: 16 (2024 14:12) (16 - 16)  SpO2: 98% (2024 14:12) (98% - 98%)    Parameters below as of 2024 14:12  Patient On (Oxygen Delivery Method): room air        **************************  Constitutional: Alert & Oriented x3, No acute distress  Respiratory: ORA  Gastrointestinal: moderately tender in all four quadrants, no rebound, no guarding, +BS  Pelvic exam: deferred as pt was seen in U/S  Extremities: no calf tenderness or swelling    LABS:                        10.9   14.34 )-----------( 267      ( 2024 10:42 )             33.0         137  |  106  |  16  ----------------------------<  131<H>  4.3   |  21<L>  |  0.60    Ca    8.2<L>      2024 10:42    TPro  5.6<L>  /  Alb  3.5  /  TBili  0.4  /  DBili  x   /  AST  12  /  ALT  13  /  AlkPhos  47        Urinalysis Basic - ( 2024 10:42 )    Color: x / Appearance: x / SG: x / pH: x  Gluc: 131 mg/dL / Ketone: x  / Bili: x / Urobili: x   Blood: x / Protein: x / Nitrite: x   Leuk Esterase: x / RBC: x / WBC x   Sq Epi: x / Non Sq Epi: x / Bacteria: x      HCG Quantitative, Serum: 749 mIU/mL ( @ 10:42)      RADIOLOGY & ADDITIONAL STUDIES: 38 y.o.  LMP 3 weeks ago presenting for abdominal pain, nausea, vomiting, syncope x4 in the setting of a positive beta hcg. Patient reports she had acute onset pain yesterday night that was 8/10 in the LLQ. She had one episode of emesis at that time. She did not take advil as she was worried about bleeding and did not have access to tylenol. Over the course of the evening she got little sleep and had one more episode of emesis. Ultimately, the patient tried to get up this morning and felt she had 4 syncopal episodes where she felt lightheaded and dizzy. Pt reports she did not hit her head at  any point. She also endorses 2-4 episodes of diarrhea over the last two days. She denies fever, chills, chest pain, sob, vaginal bleeding, abnormal discharge, constipation, diarrhea.    OBHx:   G1:    G2:    G3: 2019 ectopic with salpingectomy (pt unsure of side)  G4::    GYN Hx: denies STD/STI, cysts, fibroids, abnormal pap, has nuvaring in place  PMHx: denies  SHx: L/S salpingectomy for ruptured ectopic in 2019  Medications ozempic every other week, Patient took Ozempic 2 days ago and her symptoms started yesterday.  Allergies: NKDA    PHYSICAL EXAM:   Vital Signs Last 24 Hrs  T(C): 36.8 (2024 14:12), Max: 36.8 (2024 14:12)  T(F): 98.2 (2024 14:12), Max: 98.2 (2024 14:12)  HR: 90 (2024 14:12) (90 - 98)  BP: 113/75 (2024 14:12) (113/75 - 137/77)  BP(mean): --  RR: 16 (2024 14:12) (16 - 16)  SpO2: 98% (2024 14:12) (98% - 98%)    Parameters below as of 2024 14:12  Patient On (Oxygen Delivery Method): room air        **************************  Constitutional: Alert & Oriented x3, No acute distress  Respiratory: ORA  Gastrointestinal: moderately tender in all four quadrants, no rebound, no guarding, +BS  Pelvic exam: deferred as pt was seen in U/S  Extremities: no calf tenderness or swelling    LABS:                        10.9   14.34 )-----------( 267      ( 2024 10:42 )             33.0         137  |  106  |  16  ----------------------------<  131<H>  4.3   |  21<L>  |  0.60    Ca    8.2<L>      2024 10:42    TPro  5.6<L>  /  Alb  3.5  /  TBili  0.4  /  DBili  x   /  AST  12  /  ALT  13  /  AlkPhos  47        Urinalysis Basic - ( 2024 10:42 )    Color: x / Appearance: x / SG: x / pH: x  Gluc: 131 mg/dL / Ketone: x  / Bili: x / Urobili: x   Blood: x / Protein: x / Nitrite: x   Leuk Esterase: x / RBC: x / WBC x   Sq Epi: x / Non Sq Epi: x / Bacteria: x      HCG Quantitative, Serum: 749 mIU/mL ( @ 10:42)      RADIOLOGY & ADDITIONAL STUDIES: 38 y.o.  LMP 3 weeks ago presenting for abdominal pain, nausea, vomiting, syncope x4 in the setting of a positive beta hcg. Patient reports she had acute onset pain yesterday night that was 8/10 in the LLQ. She had one episode of emesis at that time. She did not take advil as she was worried about bleeding and did not have access to tylenol. Over the course of the evening she got little sleep and had one more episode of emesis. Ultimately, the patient tried to get up this morning and felt she had 4 syncopal episodes where she felt lightheaded and dizzy. Pt reports she did not hit her head at  any point. She also endorses 2-4 episodes of diarrhea over the last two days. She denies fever, chills, chest pain, sob, vaginal bleeding, abnormal discharge, constipation.    OBHx:   G1:    G2:    G3: 2019 ectopic with salpingectomy (pt unsure of side - became pregnant with IUD)  G4::   (became pregnant with IUD)   GYN Hx: denies STD/STI, cysts, fibroids, abnormal pap, has nuvaring in place. Sees Dr. Matias  PMHx: denies  SHx: L/S salpingectomy for ruptured ectopic in 2019  Medications ozempic every other week, Patient took Ozempic 2 days ago () and her symptoms started yesterday.  Allergies: NKDA    PHYSICAL EXAM:   Vital Signs Last 24 Hrs  T(C): 36.8 (2024 14:12), Max: 36.8 (2024 14:12)  T(F): 98.2 (2024 14:12), Max: 98.2 (2024 14:12)  HR: 90 (2024 14:12) (90 - 98)  BP: 113/75 (2024 14:12) (113/75 - 137/77)  BP(mean): --  RR: 16 (2024 14:12) (16 - 16)  SpO2: 98% (2024 14:12) (98% - 98%)    Parameters below as of 2024 14:12  Patient On (Oxygen Delivery Method): room air        **************************  Constitutional: Alert & Oriented x3, Appears to be in some pain during TVUS  Respiratory: ORA  Gastrointestinal: moderately tender in all four quadrants, no rebound, no guarding, +BS  Pelvic exam: deferred as pt was seen in U/S  Extremities: no calf tenderness or swelling    LABS:                        10.9   14.34 )-----------( 267      ( 2024 10:42 )             33.0         137  |  106  |  16  ----------------------------<  131<H>  4.3   |  21<L>  |  0.60    Ca    8.2<L>      2024 10:42    TPro  5.6<L>  /  Alb  3.5  /  TBili  0.4  /  DBili  x   /  AST  12  /  ALT  13  /  AlkPhos  47        Urinalysis Basic - ( 2024 10:42 )    Color: x / Appearance: x / SG: x / pH: x  Gluc: 131 mg/dL / Ketone: x  / Bili: x / Urobili: x   Blood: x / Protein: x / Nitrite: x   Leuk Esterase: x / RBC: x / WBC x   Sq Epi: x / Non Sq Epi: x / Bacteria: x      HCG Quantitative, Serum: 749 mIU/mL ( @ 10:42)      RADIOLOGY & ADDITIONAL STUDIES:

## 2024-02-09 NOTE — BRIEF OPERATIVE NOTE - NSICDXBRIEFPROCEDURE_GEN_ALL_CORE_FT
PROCEDURES:  Hysteroscopy with biopsy 09-Feb-2024 19:52:48  iN Silva  Salpingectomy for ectopic pregnancy 09-Feb-2024 19:53:31  Ni Silva

## 2024-02-09 NOTE — ED PROVIDER NOTE - PROGRESS NOTE DETAILS
MD Bob: hcg+, CTAP cancelled due to elevated hcg, US pelvic ordered to evaluate for iup vs ectopic vs pregnancy of unknown location. Dr. Mckeon at bedside requesting expedited US, pt requesting more pain meds. tylenol and morphine ordered, pt being taken to US by Dr. Mckeon, charge RN aware MD Bob: tvus appears to show empty uterus and free fluid surrounding L adnexa, Concerning for ruptured ectopic pregnancy in setting of abdominal pain and positive hCG and history of ectopic.  Will call radiology to attempt to expedite final read.  Dr. Mckeon states that Dr. Mckeon from OB/GYN is aware of patient.  Spoke with GYN who states they will come to evaluate pt in ED. preop labs ordered. pt's RN and charge RN are aware of pt, asked charge RN to expedite pt being returned to ED from US suite. MD Bob: Attempted to expedite patient return from ultrasound, however upon check-in, patient was still having her ultrasound performed. spoke again with GYN, they state that they evaluated patient in the ultrasound suite, and are concerned for ruptured vs aborting ectopic  pregnancy.  They recommend that patient receive a repeat CBC when she returns to the ER after ultrasound, and a type and screen.  They recommend admission to their service.  They are awaiting discussion with their attending. MD Bob: pt returned to ED from US, gyn at bedside. ED RN at bedside administering ivf and obtaining cbc and T&S which are now processing. gyn request switch admitting attending to aleks Goyal in order for attending change and also called admitting who state they are changing admitting attending name. pt reassessed appears awake alert nad

## 2024-02-09 NOTE — ED ADULT NURSE NOTE - NSFALLRISKINTERV_ED_ALL_ED
Assistance OOB with selected safe patient handling equipment if applicable/Communicate fall risk and risk factors to all staff, patient, and family/Orthostatic vital signs/Provide visual cue: yellow wristband, yellow gown, etc/Reinforce activity limits and safety measures with patient and family/Call bell, personal items and telephone in reach/Instruct patient to call for assistance before getting out of bed/chair/stretcher/Non-slip footwear applied when patient is off stretcher/[place] to call system/Physically safe environment - no spills, clutter or unnecessary equipment/Purposeful Proactive Rounding/Room/bathroom lighting operational, light cord in reach

## 2024-02-09 NOTE — H&P ADULT - NSHPPHYSICALEXAM_GEN_ALL_CORE
**************************  Constitutional: Alert & Oriented x3, Appears to be in some pain during TVUS  Respiratory: ORA  Gastrointestinal: moderately tender in all four quadrants, no rebound, no guarding, +BS  Pelvic exam: deferred as pt was seen in U/S  Extremities: no calf tenderness or swelling

## 2024-02-09 NOTE — CONSULT NOTE ADULT - SUBJECTIVE AND OBJECTIVE BOX
HPI:  38 y.o.  LMP 3 weeks ago presenting for abdominal pain, nausea, vomiting, syncope x4 in the setting of a positive beta hcg. Patient reports she had acute onset pain yesterday night that was 8/10 in the LLQ. She had one episode of emesis at that time. She did not take advil as she was worried about bleeding and did not have access to tylenol. Over the course of the evening she got little sleep and had one more episode of emesis. Ultimately, the patient tried to get up this morning and felt she had 4 syncopal episodes where she felt lightheaded and dizzy. Pt reports she did not hit her head at  any point. She also endorses 2-4 episodes of diarrhea over the last two days. She denies fever, chills, chest pain, sob, vaginal bleeding, abnormal discharge, constipation.    OBHx:   G1:    G2:    G3: 2019 ectopic with salpingectomy (pt unsure of side - became pregnant with IUD)  G4::   (became pregnant with IUD)   GYN Hx: denies STD/STI, cysts, fibroids, abnormal pap, has nuvaring in place. Sees Dr. Matias  PMHx: denies  SHx: L/S salpingectomy for ruptured ectopic in 2019  Medications ozempic every other week, Patient took Ozempic 2 days ago () and her symptoms started yesterday.  Allergies: NKDA    PHYSICAL EXAM:   Vital Signs Last 24 Hrs  T(C): 36.8 (2024 14:12), Max: 36.8 (2024 14:12)  T(F): 98.2 (2024 14:12), Max: 98.2 (2024 14:12)  HR: 90 (2024 14:12) (90 - 98)  BP: 113/75 (2024 14:12) (113/75 - 137/77)  BP(mean): --  RR: 16 (2024 14:12) (16 - 16)  SpO2: 98% (2024 14:12) (98% - 98%)    Parameters below as of 2024 14:12  Patient On (Oxygen Delivery Method): room air        **************************  Constitutional: Alert & Oriented x3, Appears to be in some pain during TVUS  Respiratory: ORA  Gastrointestinal: moderately tender in all four quadrants, no rebound, no guarding, +BS  Pelvic exam: deferred as pt was seen in U/S  Extremities: no calf tenderness or swelling    LABS:                        10.9   14.34 )-----------( 267      ( 2024 10:42 )             33.0         137  |  106  |  16  ----------------------------<  131<H>  4.3   |  21<L>  |  0.60    Ca    8.2<L>      2024 10:42    TPro  5.6<L>  /  Alb  3.5  /  TBili  0.4  /  DBili  x   /  AST  12  /  ALT  13  /  AlkPhos  47        Urinalysis Basic - ( 2024 10:42 )    Color: x / Appearance: x / SG: x / pH: x  Gluc: 131 mg/dL / Ketone: x  / Bili: x / Urobili: x   Blood: x / Protein: x / Nitrite: x   Leuk Esterase: x / RBC: x / WBC x   Sq Epi: x / Non Sq Epi: x / Bacteria: x      HCG Quantitative, Serum: 749 mIU/mL ( @ 10:42)      RADIOLOGY & ADDITIONAL STUDIES: (2024 16:07)      GENERAL SURGERY ADDENDUM: 38F w hx of ectopic pregnancy in the past presents with one day hx of diffuse abdominal pain, nausea, nbnb emesis x2, diarrhea x2, and syncope x4 (no headstrike, passed out on bed). She denies fever, chills, chest pain, sob, vaginal bleeding, abnormal discharge, constipation. In ED, VSS, Hg 10, beta-hcg inc to 749. Ultrasound suspicious for pregnancy of unknown origin.     Allergies    spironolactone (Hives)    Intolerances        ICU Vital Signs Last 24 Hrs  T(F): 98.9 (24 @ 16:45), Max: 98.9 (02-09-24 @ 16:45)  HR: 81 (24 @ 17:15) (81 - 98)  BP: 117/77 (24 @ 17:15) (113/75 - 137/77)  BP(mean): --  ABP: --  RR: 16 (24 @ 17:15) (16 - 16)  SpO2: 100% (24 @ 17:15) (98% - 100%)    General: AAOx3, uncomfortable 2/2 pain  Cardio: RRR  Pulm: No respiratory distress  Abdomen: soft, nondistended, diffusely TTP w guarding in L flank  Extremities: WWP, peripheral pulses appreciated  LABS:        137  |  106  |  16  ----------------------------<  131<H>  4.3   |  21<L>  |  0.60    Ca    8.2<L>      2024 10:42    TPro  5.6<L>  /  Alb  3.5  /  TBili  0.4  /  DBili  x   /  AST  12  /  ALT  13  /  AlkPhos  47  02-  LIVER FUNCTIONS - ( 2024 10:42 )  Alb: 3.5 g/dL / Pro: 5.6 g/dL / ALK PHOS: 47 U/L / ALT: 13 U/L / AST: 12 U/L / GGT: x                               10.0   9.32  )-----------( 266      ( 2024 16:55 )             30.8     Urinalysis Basic - ( 2024 10:42 )    Color: Yellow / Appearance: Clear / S.026 / pH: x  Gluc: 131 mg/dL / Ketone: 80 mg/dL  / Bili: Negative / Urobili: 0.2 mg/dL   Blood: x / Protein: 30 mg/dL / Nitrite: Negative   Leuk Esterase: Negative / RBC: 3 /HPF / WBC 6 /HPF   Sq Epi: x / Non Sq Epi: 10 /HPF / Bacteria: Occasional /HPF    CAPILLARY BLOOD GLUCOSE      POCT Blood Glucose.: 104 mg/dL (2024 10:26)

## 2024-02-09 NOTE — ED PROVIDER NOTE - PHYSICAL EXAMINATION
GENERAL:  Awake, alert and in NAD, non-toxic appearing  ENMT: Airway patent  EYES: conjunctiva clear  CARDIAC: Regular rate, regular rhythm.  Heart sounds S1, S2, no S3, S4. No murmurs, rubs or gallops.  RESPIRATORY: Breath sounds clear and equal in bilateral anterior lung fields, no wheezes/ronchi/crackles/stridor; pt breathing and speaking comfortably with no increased WOB, no accessory mm. use, no intercostal retractions, no nasal flaring  GI: abdomen soft, non-distended, +diffuse ttp with some guarding, no rebound, primarily in periumbilical region  SKIN: warm and dry, no rashes, no crepitus over neck  PSYCH: awake, alert, calm and cooperative  EXTREMITIES: no ble edema  NEURO: gcs 15

## 2024-02-09 NOTE — ED PROVIDER NOTE - NS ED ROS FT
Positive: Abdominal pain, nausea, vomiting, diarrhea, syncope    Negative: Fever, chills, congestion, cough, chest pain, shortness of breath, dysuria, hematuria, leg edema, rash, seizure, head trauma

## 2024-02-09 NOTE — ASU DISCHARGE PLAN (ADULT/PEDIATRIC) - CARE PROVIDER_API CALL
Delia Matias  Obstetrics and Gynecology  [address] [place] [address], [place] [address]  Phone: [phone]  Fax: [phone]  Follow Up Time:

## 2024-02-09 NOTE — PRE-ANESTHESIA EVALUATION ADULT - NSRADCARDRESULTSFT_GEN_ALL_CORE
CXR 2/9/2024  "INTERPRETATION:  Clinical history reason for exam: Pain.    Frontal chest, low lung volumes.    No comparison.    Findings/  impression: Heart, lungs, mediastinum and thorax are unremarkable"

## 2024-02-09 NOTE — PACU DISCHARGE NOTE - THE ANESTHESIA ORDERS USED IN THE PACU ORDER SET WILL BE DISCONTINUED UPON TRANSFER OF THIS PATIENT
----- Message from Bradley Valentin MA sent at 2/23/2023  3:52 PM CST -----    ----- Message -----  From: Angeli Hull  Sent: 2/23/2023   3:45 PM CST  To: Tacho Salcido ([facility]) Staff    Patient is requesting a F/U however just receive medication please advise when follow can be scheduled..Please call her back at [phone].      
Statement Selected

## 2024-02-09 NOTE — H&P ADULT - NSHPLABSRESULTS_GEN_ALL_CORE
LABS:                        10.9   14.34 )-----------( 267      ( 09 Feb 2024 10:42 )             33.0     02-09    137  |  106  |  16  ----------------------------<  131<H>  4.3   |  21<L>  |  0.60    Ca    8.2<L>      09 Feb 2024 10:42    TPro  5.6<L>  /  Alb  3.5  /  TBili  0.4  /  DBili  x   /  AST  12  /  ALT  13  /  AlkPhos  47  02-09      Urinalysis Basic - ( 09 Feb 2024 10:42 )    Color: x / Appearance: x / SG: x / pH: x  Gluc: 131 mg/dL / Ketone: x  / Bili: x / Urobili: x   Blood: x / Protein: x / Nitrite: x   Leuk Esterase: x / RBC: x / WBC x   Sq Epi: x / Non Sq Epi: x / Bacteria: x      HCG Quantitative, Serum: 749 mIU/mL (02-09 @ 10:42)

## 2024-02-09 NOTE — H&P ADULT - ASSESSMENT
38 y.o.  LMP 3 weeks ago presenting for abdominal pain, nausea, vomiting, syncope x4 in the setting of a positive beta hcg.   # R/o Ruptured Ectopic  - Vital signs normal, pt is clinically and hemodynamically stable with hgb 10.9, full labs wnl  - Pt has free fluid in abdomen on TVUS seen at bedside in radiology, with beta hcg 749, with diffuse abdominal tenderness  - Plan to f/u TVUS read  - Recommend type and screen and IVF and repeat stat CBC  - Discussion had with patient that given the current clinical picture, patient may have another ruptured ectopic. Pt advised that this will require a diagnostic laparoscopy, exam under anesthesia, possible salpingectomy, oophorectomy, hysterectomy as life saving procedure. Pt counseled on risks/benefits. Pt counseled that if she has a second salpingectomy that is indicated for a ruptured ectopic she will be unable to conceive naturally. She will require IVF in order to achieve pregnancy in the future. The pt endorsed her understanding of all the above.  # Syncope  - Syncopal episodes could be 2/2 ruptured ectopic, recommend EKG to rule out cardiovascular causes    D/w: Dr. Hines and Dr. Mckeon and Dr. Hsieh 38 y.o.  LMP 3 weeks ago presenting for abdominal pain, nausea, vomiting, syncope x4 in the setting of a positive beta hcg.   # R/o Ruptured Ectopic  - Vital signs normal, pt is clinically and hemodynamically stable with hgb 10.9, full labs wnl  - Pt has free fluid in abdomen on TVUS seen at bedside in radiology, with beta hcg 749, with diffuse abdominal tenderness  - Plan to f/u TVUS read  - Recommend type and screen and IVF and repeat stat CBC  - Discussion had with patient that given the current clinical picture, patient may have another ruptured ectopic. Pt advised that this will require a diagnostic laparoscopy, exam under anesthesia, possible salpingectomy, oophorectomy, hysterectomy as life saving procedure. Pt counseled on risks/benefits. Pt counseled that if she has a second salpingectomy that is indicated for a ruptured ectopic she will be unable to conceive naturally. She will require IVF in order to achieve pregnancy in the future. The pt endorsed her understanding of all the above  - Plan to add patient on as Class 2 for diagnostic laparoscopy, exam under anesthesia, possible salpingectomy, oophorectomy, hysterectomy as life saving procedure  # Syncope  - Syncopal episodes could be 2/2 ruptured ectopic, recommend EKG to rule out cardiovascular causes    D/w: Dr. Hines and Dr. Mckeon and Dr. Hsieh 38 y.o.  LMP 3 weeks ago presenting for abdominal pain, nausea, vomiting, syncope x4 in the setting of a positive beta hcg.   # R/o Ruptured Ectopic  - Vital signs normal, pt is clinically and hemodynamically stable with hgb 10.9  - Pt has free fluid in abdomen on TVUS seen at bedside in radiology, with beta hcg 749, with diffuse abdominal tenderness  - Plan to f/u TVUS read  - Recommend type and screen and IVF and repeat stat CBC  - Discussion had with patient that given the current clinical picture, patient may have another ruptured ectopic. Pt advised that this will require a diagnostic laparoscopy, exam under anesthesia, possible salpingectomy, oophorectomy, hysterectomy as life saving procedure. Pt counseled on risks/benefits. Pt counseled that if she has a second salpingectomy that is indicated for a ruptured ectopic she will be unable to conceive naturally. She will require IVF in order to achieve pregnancy in the future. The pt endorsed her understanding of all the above  - Plan to add patient on as Class 2 for diagnostic laparoscopy, exam under anesthesia, possible salpingectomy, oophorectomy, hysterectomy as life saving procedure  # Syncope  - Syncopal episodes could be 2/2 ruptured ectopic, recommend EKG to rule out cardiovascular causes    D/w: Dr. Hines and Dr. Mckeon and Dr. Hsieh      PGY4 Addendum: pt seen urgently in US room upon report from general surgery regarding findings concerning for ruptured ectopic with associated free fluid. Although pt does not have peritoneal signs on abdominal exam, in the setting of syncope, positive b-hcg, hx of ruptured ectopic, and free fluid in the pelvis, pt counseled regarding need for dx l/s to evaluate for possible ruptured ectopic. Emergency room asked to obtain urgent T&S and repeat CBC as it has been 5 hours since her initial CBC. Pt understanding that if we remove her remaining fallopian tube, she will be unable to become pregnancy by spontaneous means and will require reproductive assistance with IVF. Pt expressed understanding and understands the risks/benefits of a l/s salpingectomy for a ruptured ectopic pregnancy, Pt last ate at 1800 last night. OR informed that pt took ozempic two days ago. Pt to be consented and added on as a Class II. - Rossi Hines, PGY4

## 2024-02-09 NOTE — ED PROVIDER NOTE - OBJECTIVE STATEMENT
38F c no PMH, PSH of tonsillectomy and surgical removal of ectopic pregnancy p/w 1 day History of abdominal pain, diarrhea, and nonbloody nonbilious nausea and vomiting and syncope. Patient takes Ozempic intermittently and took Ozempic 2 days ago and her symptoms started yesterday.  Patient denies associated fever, chest pain, shortness of breath, dysuria.  patient states that today she was lightheaded and  lost consciousness multiple times in setting of lightheadedness.   at bedside states he witnessed the syncopal episodes, there was no noted seizures, no head trauma.

## 2024-02-09 NOTE — PRE-ANESTHESIA EVALUATION ADULT - NSANTHPMHFT_GEN_ALL_CORE
38 year old female with no relevant PMH and PSH of salpingectomy for ectopic pregnancy  presenting with suspected Ectopic Pregnancy now schedule for Diagnostic Laparoscopy for Salpingectomy, Possible Oophorectomy, Possible Hysterectomy.    Cardiac: Denies HTN, HLD, MI/Angina/Heart Failure, Arrhythmia, Murmur/Valvular Disorder. >4 METS  Pulmonary: Denies Asthma, COPD, ARABELLA  Renal: Denies kidney dysfunction  Hepatic: Denies liver dysfunction  Gastrointestinal: Currently taking ozempic, last taken 2024 per patient report. Denies GERD/IBS  Endocrine: Denies DM or thyroid dysfunction  Neurologic: Denies stroke/seizure disorder  Hematologic: Positive for anemia, hgb 10.0. Denies blood clotting disorder, blood thinning medication.    PSH: Salpingectomy for ectopic pregnancy 2019.

## 2024-02-09 NOTE — ASU DISCHARGE PLAN (ADULT/PEDIATRIC) - CALL YOUR DOCTOR IF YOU HAVE ANY OF THE FOLLOWING:
Bleeding that does not stop/Swelling that gets worse/Fever greater than (need to indicate Fahrenheit or Celsius)/Nausea and vomiting that does not stop/Unable to urinate/Inability to tolerate liquids or foods

## 2024-02-09 NOTE — PATIENT PROFILE ADULT - FALL HARM RISK - HARM RISK INTERVENTIONS

## 2024-02-09 NOTE — CONSULT NOTE ADULT - ATTENDING COMMENTS
Patient seen in ER at 14:20    38F w hx of ectopic pregnancy in the past presents with one day hx of diffuse abdominal pain, nausea, nbnb emesis x2, diarrhea x2, and syncope x4 (no headstrike, passed out on bed). She denies fever, chills, chest pain, sob, vaginal bleeding, abnormal discharge, constipation. In ED, VSS, Hg 10, beta-hcg inc to 749. Ultrasound : extrauterine pregnancy     To the OR with GYN

## 2024-02-09 NOTE — ED ADULT NURSE NOTE - CHIEF COMPLAINT QUOTE
Pt presents to the ED for lower abd pain since last night with multiple episodes of vomiting and diarrhea. No coffee ground emesis or BRB in vomit. Pt endorses synopsizing 2 times this morning. No head strike or blood thinner use. Pt received 1L NS by EMS. Hx

## 2024-02-09 NOTE — ASU DISCHARGE PLAN (ADULT/PEDIATRIC) - NS MD DC FALL RISK RISK
For information on Fall & Injury Prevention, visit: [email or web address] OR  [email or web address] OR  https://www.cdc.gov/steadi/patient.html

## 2024-02-10 ENCOUNTER — TRANSCRIPTION ENCOUNTER (OUTPATIENT)
Age: 39
End: 2024-02-10

## 2024-02-10 VITALS
DIASTOLIC BLOOD PRESSURE: 64 MMHG | TEMPERATURE: 99 F | RESPIRATION RATE: 17 BRPM | OXYGEN SATURATION: 95 % | SYSTOLIC BLOOD PRESSURE: 100 MMHG | HEART RATE: 89 BPM

## 2024-02-10 LAB
HCT VFR BLD CALC: 23.3 % — LOW (ref 34.5–45)
HGB BLD-MCNC: 7.6 G/DL — LOW (ref 11.5–15.5)
MCHC RBC-ENTMCNC: 30.2 PG — SIGNIFICANT CHANGE UP (ref 27–34)
MCHC RBC-ENTMCNC: 32.6 GM/DL — SIGNIFICANT CHANGE UP (ref 32–36)
MCV RBC AUTO: 92.5 FL — SIGNIFICANT CHANGE UP (ref 80–100)
NRBC # BLD: 0 /100 WBCS — SIGNIFICANT CHANGE UP (ref 0–0)
PLATELET # BLD AUTO: 207 K/UL — SIGNIFICANT CHANGE UP (ref 150–400)
RBC # BLD: 2.52 M/UL — LOW (ref 3.8–5.2)
RBC # FLD: 12.9 % — SIGNIFICANT CHANGE UP (ref 10.3–14.5)
WBC # BLD: 6.88 K/UL — SIGNIFICANT CHANGE UP (ref 3.8–10.5)
WBC # FLD AUTO: 6.88 K/UL — SIGNIFICANT CHANGE UP (ref 3.8–10.5)

## 2024-02-10 PROCEDURE — 83690 ASSAY OF LIPASE: CPT

## 2024-02-10 PROCEDURE — 85027 COMPLETE CBC AUTOMATED: CPT

## 2024-02-10 PROCEDURE — 85610 PROTHROMBIN TIME: CPT

## 2024-02-10 PROCEDURE — 99285 EMERGENCY DEPT VISIT HI MDM: CPT | Mod: 25

## 2024-02-10 PROCEDURE — 88304 TISSUE EXAM BY PATHOLOGIST: CPT

## 2024-02-10 PROCEDURE — 76817 TRANSVAGINAL US OBSTETRIC: CPT

## 2024-02-10 PROCEDURE — 96375 TX/PRO/DX INJ NEW DRUG ADDON: CPT

## 2024-02-10 PROCEDURE — 88305 TISSUE EXAM BY PATHOLOGIST: CPT

## 2024-02-10 PROCEDURE — 96376 TX/PRO/DX INJ SAME DRUG ADON: CPT

## 2024-02-10 PROCEDURE — 86900 BLOOD TYPING SEROLOGIC ABO: CPT

## 2024-02-10 PROCEDURE — 86850 RBC ANTIBODY SCREEN: CPT

## 2024-02-10 PROCEDURE — 81001 URINALYSIS AUTO W/SCOPE: CPT

## 2024-02-10 PROCEDURE — 76801 OB US < 14 WKS SINGLE FETUS: CPT

## 2024-02-10 PROCEDURE — 84702 CHORIONIC GONADOTROPIN TEST: CPT

## 2024-02-10 PROCEDURE — 0225U NFCT DS DNA&RNA 21 SARSCOV2: CPT

## 2024-02-10 PROCEDURE — C1889: CPT

## 2024-02-10 PROCEDURE — 36415 COLL VENOUS BLD VENIPUNCTURE: CPT

## 2024-02-10 PROCEDURE — 76802 OB US < 14 WKS ADDL FETUS: CPT

## 2024-02-10 PROCEDURE — 86901 BLOOD TYPING SEROLOGIC RH(D): CPT

## 2024-02-10 PROCEDURE — 93005 ELECTROCARDIOGRAM TRACING: CPT

## 2024-02-10 PROCEDURE — C9399: CPT

## 2024-02-10 PROCEDURE — 82962 GLUCOSE BLOOD TEST: CPT

## 2024-02-10 PROCEDURE — 85025 COMPLETE CBC W/AUTO DIFF WBC: CPT

## 2024-02-10 PROCEDURE — 71045 X-RAY EXAM CHEST 1 VIEW: CPT

## 2024-02-10 PROCEDURE — 80053 COMPREHEN METABOLIC PANEL: CPT

## 2024-02-10 PROCEDURE — 96374 THER/PROPH/DIAG INJ IV PUSH: CPT

## 2024-02-10 RX ORDER — ACETAMINOPHEN 500 MG
2 TABLET ORAL
Qty: 0 | Refills: 0 | DISCHARGE
Start: 2024-02-10

## 2024-02-10 RX ORDER — SIMETHICONE 80 MG/1
80 TABLET, CHEWABLE ORAL EVERY 6 HOURS
Refills: 0 | Status: DISCONTINUED | OUTPATIENT
Start: 2024-02-10 | End: 2024-02-10

## 2024-02-10 RX ORDER — INFLUENZA VIRUS VACCINE 15; 15; 15; 15 UG/.5ML; UG/.5ML; UG/.5ML; UG/.5ML
0.5 SUSPENSION INTRAMUSCULAR ONCE
Refills: 0 | Status: DISCONTINUED | OUTPATIENT
Start: 2024-02-10 | End: 2024-02-10

## 2024-02-10 RX ORDER — OXYCODONE HYDROCHLORIDE 5 MG/1
1 TABLET ORAL
Qty: 5 | Refills: 0
Start: 2024-02-10

## 2024-02-10 RX ORDER — IRON SUCROSE 20 MG/ML
300 INJECTION, SOLUTION INTRAVENOUS ONCE
Refills: 0 | Status: COMPLETED | OUTPATIENT
Start: 2024-02-10 | End: 2024-02-10

## 2024-02-10 RX ADMIN — Medication 30 MILLIGRAM(S): at 12:39

## 2024-02-10 RX ADMIN — OXYCODONE HYDROCHLORIDE 10 MILLIGRAM(S): 5 TABLET ORAL at 10:48

## 2024-02-10 RX ADMIN — OXYCODONE HYDROCHLORIDE 10 MILLIGRAM(S): 5 TABLET ORAL at 19:38

## 2024-02-10 RX ADMIN — Medication 1000 MILLIGRAM(S): at 02:20

## 2024-02-10 RX ADMIN — IRON SUCROSE 176.67 MILLIGRAM(S): 20 INJECTION, SOLUTION INTRAVENOUS at 10:44

## 2024-02-10 RX ADMIN — Medication 1000 MILLIGRAM(S): at 17:19

## 2024-02-10 RX ADMIN — Medication 30 MILLIGRAM(S): at 05:40

## 2024-02-10 RX ADMIN — SIMETHICONE 80 MILLIGRAM(S): 80 TABLET, CHEWABLE ORAL at 12:40

## 2024-02-10 RX ADMIN — OXYCODONE HYDROCHLORIDE 10 MILLIGRAM(S): 5 TABLET ORAL at 18:35

## 2024-02-10 RX ADMIN — OXYCODONE HYDROCHLORIDE 10 MILLIGRAM(S): 5 TABLET ORAL at 11:30

## 2024-02-10 RX ADMIN — Medication 1000 MILLIGRAM(S): at 08:08

## 2024-02-10 RX ADMIN — SIMETHICONE 80 MILLIGRAM(S): 80 TABLET, CHEWABLE ORAL at 05:51

## 2024-02-10 RX ADMIN — Medication 30 MILLIGRAM(S): at 18:52

## 2024-02-10 NOTE — DISCHARGE NOTE NURSING/CASE MANAGEMENT/SOCIAL WORK - NSDCPEFALRISK_GEN_ALL_CORE
For information on Fall & Injury Prevention, visit: [email or web address] OR  [email or web address] OR  https://www.cdc.gov/steadi/patient.html

## 2024-02-10 NOTE — PROGRESS NOTE ADULT - SUBJECTIVE AND OBJECTIVE BOX
Pt seen and examined at bedside. Pt complains of mild abdominal pain. Has not yet passed TOV.  Pt denies any fever, chills, chest pain, SOB, nausea or vomiting     T(F): 97.8 (02-09-24 @ 22:34), Max: 98.9 (02-09-24 @ 16:45)  HR: 73 (02-09-24 @ 22:34) (66 - 104)  BP: 117/73 (02-09-24 @ 22:34) (107/58 - 138/72)  RR: 17 (02-09-24 @ 22:34) (12 - 20)  SpO2: 100% (02-09-24 @ 22:34) (98% - 100%)      02-09 @ 07:01  -  02-10 @ 01:16  --------------------------------------------------------  IN: 3375 mL / OUT: 1500 mL / NET: 1875 mL        acetaminophen     Tablet .. 1000 milliGRAM(s) Oral every 6 hours  HYDROmorphone  Injectable 0.5 milliGRAM(s) IV Push every 15 minutes PRN breakthrough pain in the PACU  ketorolac   Injectable 30 milliGRAM(s) IV Push every 6 hours  lactated ringers. 1000 milliLiter(s) IV Continuous <Continuous>  metoclopramide Injectable 10 milliGRAM(s) IV Push every 6 hours PRN Nausea and/or Vomiting (2nd line)  ondansetron Injectable 8 milliGRAM(s) IV Push every 6 hours PRN Nausea and/or Vomiting (1st line)  oxyCODONE    IR 5 milliGRAM(s) Oral every 6 hours PRN Moderate Pain (4 - 6)  oxyCODONE    IR 10 milliGRAM(s) Oral every 6 hours PRN Severe Pain (7 - 10)  pantoprazole  Injectable 20 milliGRAM(s) IV Push daily  simethicone 80 milliGRAM(s) Chew every 6 hours PRN Gas      Physical exam:  Constitutional: NAD  Abdomen: incision sites clean, dry and intact. Soft, mildly tender, mildly distended  Extremities: no lower extremity edema, or calve tenderness. SCDs in place    A:   37yo s/p L/S salpingectomy. Meeting postop milestones appropriately.     Neuro: tylenol/toradol atc, oxy prn   Pulm: RA  CV: VSS,   GI: LFD, zofran/reglan prn, protonix qd, simethicone atc  : s/p cee  GYN: s/p L salpringectomy for ruptured ectopic  DVT ppx: SCDs    TOV 0400  AM CBC
Patient evaluated at bedside. Reports pain controlled. Ambulated and voided overnight. Tolerating PO. No flatus yet.  Denies lightheadedness/dizziness.    T(C): 37.1 (02-10-24 @ 05:30), Max: 37.1 (02-10-24 @ 05:30)  HR: 91 (02-10-24 @ 05:30) (68 - 93)  BP: 100/65 (02-10-24 @ 05:30) (100/65 - 120/63)  RR: 17 (02-10-24 @ 05:30) (12 - 19)  SpO2: 98% (02-10-24 @ 05:30) (96% - 100%)    GA: Well appearing  Resp: normal respiratory effort  Abd: +BS, soft, NT/ND, no rebound or guarding. Incisions c/d/i  Extrem: SCDs in place, no LE edema       02-09 @ 07:01  -  02-10 @ 07:00  --------------------------------------------------------  IN: 4000 mL / OUT: 2300 mL / NET: 1700 mL                              7.6    6.88  )-----------( 207      ( 10 Feb 2024 05:30 )             23.3     02-09    137  |  106  |  16  ----------------------------<  131<H>  4.3   |  21<L>  |  0.60    Ca    8.2<L>      09 Feb 2024 10:42    TPro  5.6<L>  /  Alb  3.5  /  TBili  0.4  /  DBili  x   /  AST  12  /  ALT  13  /  AlkPhos  47  02-09

## 2024-02-10 NOTE — DISCHARGE NOTE PROVIDER - NSDCMRMEDTOKEN_GEN_ALL_CORE_FT
acetaminophen 500 mg oral tablet: 2 tab(s) orally every 6 hours  oxyCODONE 5 mg oral tablet: 1 tab(s) orally every 8 hours as needed for  severe pain MDD: 3

## 2024-02-10 NOTE — DISCHARGE NOTE PROVIDER - HOSPITAL COURSE
Patient underwent a L/S L salpingectomy and removal of L cornual ectopic pregnancy. Patient’s postoperative course was unremarkable and she remained hemodynamically stable and afebrile throughout. Upon discharge the patient is ambulating and voiding spontaneously, tolerating oral intake, pain was well controlled with oral medication, and vital signs were stable.

## 2024-02-10 NOTE — DISCHARGE NOTE NURSING/CASE MANAGEMENT/SOCIAL WORK - PATIENT PORTAL LINK FT
You can access the FollowMyHealth Patient Portal offered by [facility] by registering at the following website: [email or web address]. By joining [facility]’s FollowMyHealth portal, you will also be able to view your health information using other applications (apps) compatible with our system.

## 2024-02-10 NOTE — DISCHARGE NOTE PROVIDER - CARE PROVIDER_API CALL
Delia Matias  Obstetrics and Gynecology  [address] [place] [address], [place] [address]  Phone: [phone]  Fax: [phone]  Follow Up Time:

## 2024-02-10 NOTE — DISCHARGE NOTE PROVIDER - NSDCCPTREATMENT_GEN_ALL_CORE_FT
PRINCIPAL PROCEDURE  Procedure: Salpingectomy for ectopic pregnancy  Findings and Treatment:       SECONDARY PROCEDURE  Procedure: Hysteroscopy with biopsy  Findings and Treatment:

## 2024-02-14 LAB — SURGICAL PATHOLOGY STUDY: SIGNIFICANT CHANGE UP

## 2024-03-27 ENCOUNTER — NON-APPOINTMENT (OUTPATIENT)
Age: 39
End: 2024-03-27

## 2024-03-27 ENCOUNTER — APPOINTMENT (OUTPATIENT)
Dept: INTERNAL MEDICINE | Facility: CLINIC | Age: 39
End: 2024-03-27
Payer: COMMERCIAL

## 2024-03-27 VITALS
OXYGEN SATURATION: 98 % | TEMPERATURE: 97.8 F | WEIGHT: 169 LBS | BODY MASS INDEX: 25.03 KG/M2 | HEART RATE: 98 BPM | HEIGHT: 69 IN | SYSTOLIC BLOOD PRESSURE: 121 MMHG | DIASTOLIC BLOOD PRESSURE: 78 MMHG

## 2024-03-27 DIAGNOSIS — Z00.00 ENCOUNTER FOR GENERAL ADULT MEDICAL EXAMINATION W/OUT ABNORMAL FINDINGS: ICD-10-CM

## 2024-03-27 DIAGNOSIS — D62 ACUTE POSTHEMORRHAGIC ANEMIA: ICD-10-CM

## 2024-03-27 DIAGNOSIS — O00.90 UNSPECIFIED. ECTOPIC. PREGNANCY WITHOUT INTRAUTERINE PREGNANCY: ICD-10-CM

## 2024-03-27 DIAGNOSIS — E53.8 DEFICIENCY OF OTHER SPECIFIED B GROUP VITAMINS: ICD-10-CM

## 2024-03-27 PROCEDURE — 99385 PREV VISIT NEW AGE 18-39: CPT

## 2024-03-27 PROCEDURE — 36415 COLL VENOUS BLD VENIPUNCTURE: CPT

## 2024-03-27 RX ORDER — POLYETHYLENE GLYCOL 3350 AND ELECTROLYTES WITH LEMON FLAVOR 236; 22.74; 6.74; 5.86; 2.97 G/4L; G/4L; G/4L; G/4L; G/4L
236 POWDER, FOR SOLUTION ORAL
Qty: 1 | Refills: 0 | Status: DISCONTINUED | COMMUNITY
Start: 2022-10-06 | End: 2024-03-27

## 2024-03-27 RX ORDER — RIFAXIMIN 550 MG/1
550 TABLET ORAL
Qty: 42 | Refills: 0 | Status: DISCONTINUED | COMMUNITY
Start: 2022-12-01 | End: 2024-03-27

## 2024-03-27 NOTE — ASSESSMENT
[FreeTextEntry1] : 38F recent surg for ruptured ectopic both side, B12 def, obesity on Wegovy here to establish care.  HCM - labs today, declines flu, pap with GYN B12 def - check B12, pt was taking IM B12, stopped about a year ago.  Weight loss - Wegovy

## 2024-03-27 NOTE — HISTORY OF PRESENT ILLNESS
[FreeTextEntry1] : establish care  [de-identified] : 38F recent surg for ruptured ectopic both side, B12 def, obesity on Wegovy (1mg every 2-3 weeks for 8 months here to establish care.  Was living in [place] before during covid, recently moved back.  Doing well since surgery, just cleared to go back to work few weeks ago. Has been coping well, has 3 other children in school.   HCM  works as surgical nurse at [employer]  pap with GYN  declines vaccines

## 2024-03-28 ENCOUNTER — TRANSCRIPTION ENCOUNTER (OUTPATIENT)
Age: 39
End: 2024-03-28

## 2024-03-28 LAB
ALBUMIN SERPL ELPH-MCNC: 4.4 G/DL
ALP BLD-CCNC: 81 U/L
ALT SERPL-CCNC: 13 U/L
ANION GAP SERPL CALC-SCNC: 12 MMOL/L
AST SERPL-CCNC: 12 U/L
BASOPHILS # BLD AUTO: 0.09 K/UL
BASOPHILS NFR BLD AUTO: 0.9 %
BILIRUB SERPL-MCNC: <0.2 MG/DL
BUN SERPL-MCNC: 13 MG/DL
CALCIUM SERPL-MCNC: 10.1 MG/DL
CHLORIDE SERPL-SCNC: 103 MMOL/L
CHOLEST SERPL-MCNC: 169 MG/DL
CO2 SERPL-SCNC: 23 MMOL/L
CREAT SERPL-MCNC: 0.54 MG/DL
EGFR: 122 ML/MIN/1.73M2
EOSINOPHIL # BLD AUTO: 0.04 K/UL
EOSINOPHIL NFR BLD AUTO: 0.4 %
ESTIMATED AVERAGE GLUCOSE: 85 MG/DL
FOLATE SERPL-MCNC: 11.5 NG/ML
GLUCOSE SERPL-MCNC: 100 MG/DL
HBA1C MFR BLD HPLC: 4.6 %
HCT VFR BLD CALC: 42.3 %
HDLC SERPL-MCNC: 53 MG/DL
HGB BLD-MCNC: 13.5 G/DL
IMM GRANULOCYTES NFR BLD AUTO: 0.3 %
LDLC SERPL CALC-MCNC: 86 MG/DL
LYMPHOCYTES # BLD AUTO: 3.08 K/UL
LYMPHOCYTES NFR BLD AUTO: 31.3 %
MAN DIFF?: NORMAL
MCHC RBC-ENTMCNC: 30 PG
MCHC RBC-ENTMCNC: 31.9 GM/DL
MCV RBC AUTO: 94 FL
MONOCYTES # BLD AUTO: 0.6 K/UL
MONOCYTES NFR BLD AUTO: 6.1 %
NEUTROPHILS # BLD AUTO: 6.01 K/UL
NEUTROPHILS NFR BLD AUTO: 61 %
NONHDLC SERPL-MCNC: 116 MG/DL
PLATELET # BLD AUTO: 221 K/UL
POTASSIUM SERPL-SCNC: 3.7 MMOL/L
PROT SERPL-MCNC: 6.4 G/DL
RBC # BLD: 4.5 M/UL
RBC # FLD: 13 %
SODIUM SERPL-SCNC: 138 MMOL/L
TRIGL SERPL-MCNC: 174 MG/DL
TSH SERPL-ACNC: 1.47 UIU/ML
VIT B12 SERPL-MCNC: 434 PG/ML
WBC # FLD AUTO: 9.85 K/UL

## 2024-04-17 ENCOUNTER — TRANSCRIPTION ENCOUNTER (OUTPATIENT)
Age: 39
End: 2024-04-17

## 2024-07-01 ENCOUNTER — NON-APPOINTMENT (OUTPATIENT)
Age: 39
End: 2024-07-01

## 2025-05-09 ENCOUNTER — APPOINTMENT (OUTPATIENT)
Dept: PLASTIC SURGERY | Facility: CLINIC | Age: 40
End: 2025-05-09

## 2025-05-09 ENCOUNTER — OUTPATIENT (OUTPATIENT)
Dept: OUTPATIENT SERVICES | Facility: HOSPITAL | Age: 40
LOS: 1 days | End: 2025-05-09

## 2025-05-09 DIAGNOSIS — Z90.89 ACQUIRED ABSENCE OF OTHER ORGANS: Chronic | ICD-10-CM

## 2025-05-09 DIAGNOSIS — Z48.89 ENCOUNTER FOR OTHER SPECIFIED SURGICAL AFTERCARE: ICD-10-CM

## 2025-05-09 RX ORDER — VALACYCLOVIR 500 MG/1
500 TABLET, FILM COATED ORAL TWICE DAILY
Qty: 14 | Refills: 0 | Status: ACTIVE | COMMUNITY
Start: 2025-05-09 | End: 1900-01-01

## 2025-05-15 DIAGNOSIS — Z01.89 ENCOUNTER FOR OTHER SPECIFIED SPECIAL EXAMINATIONS: ICD-10-CM

## 2025-06-30 ENCOUNTER — APPOINTMENT (OUTPATIENT)
Dept: ORTHOPEDIC SURGERY | Facility: CLINIC | Age: 40
End: 2025-06-30
Payer: COMMERCIAL

## 2025-06-30 PROBLEM — M23.91 INTERNAL DERANGEMENT OF RIGHT KNEE: Status: RESOLVED | Noted: 2025-06-30 | Resolved: 2025-06-30

## 2025-06-30 PROBLEM — M23.91 INTERNAL DERANGEMENT OF RIGHT KNEE: Status: ACTIVE | Noted: 2025-06-30

## 2025-06-30 PROCEDURE — 73562 X-RAY EXAM OF KNEE 3: CPT | Mod: RT

## 2025-06-30 PROCEDURE — 99204 OFFICE O/P NEW MOD 45 MIN: CPT

## 2025-07-01 ENCOUNTER — APPOINTMENT (OUTPATIENT)
Dept: MRI IMAGING | Age: 40
End: 2025-07-01
Payer: COMMERCIAL

## 2025-07-01 PROCEDURE — 73721 MRI JNT OF LWR EXTRE W/O DYE: CPT | Mod: RT

## 2025-07-07 ENCOUNTER — APPOINTMENT (OUTPATIENT)
Dept: ORTHOPEDIC SURGERY | Facility: CLINIC | Age: 40
End: 2025-07-07
Payer: COMMERCIAL

## 2025-07-07 PROCEDURE — 99214 OFFICE O/P EST MOD 30 MIN: CPT

## 2025-07-14 ENCOUNTER — APPOINTMENT (OUTPATIENT)
Dept: MRI IMAGING | Facility: CLINIC | Age: 40
End: 2025-07-14

## 2025-07-14 ENCOUNTER — APPOINTMENT (OUTPATIENT)
Dept: ORTHOPEDIC SURGERY | Facility: CLINIC | Age: 40
End: 2025-07-14

## 2025-07-30 ENCOUNTER — APPOINTMENT (OUTPATIENT)
Dept: HEART AND VASCULAR | Facility: CLINIC | Age: 40
End: 2025-07-30
Payer: COMMERCIAL

## 2025-07-30 VITALS
HEIGHT: 65 IN | DIASTOLIC BLOOD PRESSURE: 80 MMHG | WEIGHT: 195 LBS | OXYGEN SATURATION: 99 % | TEMPERATURE: 98.1 F | SYSTOLIC BLOOD PRESSURE: 118 MMHG | BODY MASS INDEX: 32.49 KG/M2

## 2025-07-30 DIAGNOSIS — G47.39 OTHER SLEEP APNEA: ICD-10-CM

## 2025-07-30 PROCEDURE — 99204 OFFICE O/P NEW MOD 45 MIN: CPT

## 2025-07-30 PROCEDURE — 93000 ELECTROCARDIOGRAM COMPLETE: CPT

## 2025-07-30 PROCEDURE — 36415 COLL VENOUS BLD VENIPUNCTURE: CPT

## 2025-07-31 LAB
ALBUMIN, RANDOM URINE: 3.5 MG/DL
CREAT SPEC-SCNC: 375 MG/DL
ESTIMATED AVERAGE GLUCOSE: 108 MG/DL
HBA1C MFR BLD HPLC: 5.4 %
MICROALBUMIN/CREAT 24H UR-RTO: 9 MG/G

## 2025-08-01 LAB
ALBUMIN SERPL ELPH-MCNC: 5 G/DL
ALP BLD-CCNC: 92 U/L
ALT SERPL-CCNC: 13 U/L
ANION GAP SERPL CALC-SCNC: 19 MMOL/L
AST SERPL-CCNC: 11 U/L
BILIRUB SERPL-MCNC: 0.2 MG/DL
BUN SERPL-MCNC: 13 MG/DL
CALCIUM SERPL-MCNC: 10.6 MG/DL
CHLORIDE SERPL-SCNC: 103 MMOL/L
CHOLEST SERPL-MCNC: 178 MG/DL
CO2 SERPL-SCNC: 20 MMOL/L
CREAT SERPL-MCNC: 0.72 MG/DL
EGFRCR SERPLBLD CKD-EPI 2021: 110 ML/MIN/1.73M2
GLUCOSE SERPL-MCNC: 104 MG/DL
HDLC SERPL-MCNC: 43 MG/DL
LDLC SERPL-MCNC: 120 MG/DL
NONHDLC SERPL-MCNC: 136 MG/DL
NT-PROBNP SERPL-MCNC: <36 PG/ML
POTASSIUM SERPL-SCNC: 4.4 MMOL/L
PROT SERPL-MCNC: 7.3 G/DL
SODIUM SERPL-SCNC: 142 MMOL/L
TRIGL SERPL-MCNC: 85 MG/DL

## 2025-08-11 ENCOUNTER — APPOINTMENT (OUTPATIENT)
Dept: HEART AND VASCULAR | Facility: CLINIC | Age: 40
End: 2025-08-11

## 2025-08-11 PROCEDURE — 95800 SLP STDY UNATTENDED: CPT | Mod: TC

## 2025-08-12 RX ORDER — TIRZEPATIDE 2.5 MG/.5ML
2.5 INJECTION, SOLUTION SUBCUTANEOUS
Qty: 1 | Refills: 5 | Status: ACTIVE | COMMUNITY
Start: 2025-07-30 | End: 1900-01-01

## 2025-08-13 ENCOUNTER — TRANSCRIPTION ENCOUNTER (OUTPATIENT)
Age: 40
End: 2025-08-13

## 2025-08-14 ENCOUNTER — APPOINTMENT (OUTPATIENT)
Dept: ORTHOPEDIC SURGERY | Facility: CLINIC | Age: 40
End: 2025-08-14
Payer: COMMERCIAL

## 2025-08-14 DIAGNOSIS — S83.411A SPRAIN OF MEDIAL COLLATERAL LIGAMENT OF RIGHT KNEE, INITIAL ENCOUNTER: ICD-10-CM

## 2025-08-14 DIAGNOSIS — M71.22 SYNOVIAL CYST OF POPLITEAL SPACE [BAKER], LEFT KNEE: ICD-10-CM

## 2025-08-14 PROCEDURE — 99203 OFFICE O/P NEW LOW 30 MIN: CPT

## 2025-08-14 PROCEDURE — 99213 OFFICE O/P EST LOW 20 MIN: CPT

## 2025-08-14 RX ORDER — DICLOFENAC SODIUM 10 MG/G
1 GEL TOPICAL DAILY
Qty: 100 | Refills: 2 | Status: ACTIVE | COMMUNITY
Start: 2025-08-14 | End: 1900-01-01

## 2025-08-19 ENCOUNTER — NON-APPOINTMENT (OUTPATIENT)
Age: 40
End: 2025-08-19

## 2025-09-03 ENCOUNTER — TRANSCRIPTION ENCOUNTER (OUTPATIENT)
Age: 40
End: 2025-09-03

## 2025-09-09 ENCOUNTER — NON-APPOINTMENT (OUTPATIENT)
Age: 40
End: 2025-09-09

## (undated) DEVICE — POSITIONER PINK PAD PIGAZZI SYSTEM XL W ARM PROTECTOR

## (undated) DEVICE — D HELP - CLEARVIEW CLEARIFY SYSTEM

## (undated) DEVICE — TROCAR COVIDIEN VERSAONE FIXATION CANNULA 5MM

## (undated) DEVICE — SOL IRR BAG NS 0.9% 3000ML

## (undated) DEVICE — WARMING BLANKET UPPER ADULT

## (undated) DEVICE — PACK GYN WDC

## (undated) DEVICE — FOLEY TRAY 16FR 5CC LF UMETER CLOSED

## (undated) DEVICE — TROCAR COVIDIEN BLUNT TIP HASSAN 10MM

## (undated) DEVICE — TUBING STRYKER PNEUMOCLEAR HIGH FLOW HEATED

## (undated) DEVICE — SUT MONOCRYL 4-0 27" PS-2 UNDYED

## (undated) DEVICE — LIGASURE MARYLAND 37CM

## (undated) DEVICE — TIP METZENBAUM SCISSOR MONOPOLAR ENDOCUT (ORANGE)

## (undated) DEVICE — SUT VICRYL 0 27" UR-6

## (undated) DEVICE — ENDOCATCH 10MM SPECIMEN POUCH

## (undated) DEVICE — GLV 8 PROTEXIS (WHITE)

## (undated) DEVICE — Device

## (undated) DEVICE — VENODYNE/SCD SLEEVE CALF MEDIUM

## (undated) DEVICE — POSITIONER FOAM EGG CRATE ULNAR 2PCS (PINK)

## (undated) DEVICE — INZII RETRIEVAL SYSTEM 5MM

## (undated) DEVICE — NDL GRASPING DISP

## (undated) DEVICE — INSUFFLATION NDL COVIDIEN SURGINEEDLE VERESS 120MM

## (undated) DEVICE — TROCAR COVIDIEN VERSAPORT BLADELESS OPTICAL 5MM STANDARD

## (undated) DEVICE — SPONGE ENDO PEANUT 5MM